# Patient Record
Sex: FEMALE | Race: WHITE | NOT HISPANIC OR LATINO | Employment: FULL TIME | URBAN - NONMETROPOLITAN AREA
[De-identification: names, ages, dates, MRNs, and addresses within clinical notes are randomized per-mention and may not be internally consistent; named-entity substitution may affect disease eponyms.]

---

## 2019-03-07 LAB
EXTERNAL HIV CONFIRMATION: NORMAL
EXTERNAL HIV SCREEN: NORMAL
HCV AB SER-ACNC: NEGATIVE

## 2019-07-25 LAB — HBA1C MFR BLD HPLC: 5.3 %

## 2019-10-13 ENCOUNTER — HOSPITAL ENCOUNTER (EMERGENCY)
Facility: HOSPITAL | Age: 24
Discharge: ANOTHER HOSPITAL | End: 2019-10-13
Attending: EMERGENCY MEDICINE | Admitting: EMERGENCY MEDICINE
Payer: COMMERCIAL

## 2019-10-13 VITALS
RESPIRATION RATE: 16 BRPM | SYSTOLIC BLOOD PRESSURE: 130 MMHG | TEMPERATURE: 98.5 F | OXYGEN SATURATION: 96 % | DIASTOLIC BLOOD PRESSURE: 86 MMHG | WEIGHT: 227.07 LBS | HEART RATE: 82 BPM | HEIGHT: 68 IN | BODY MASS INDEX: 34.41 KG/M2

## 2019-10-13 DIAGNOSIS — Z34.93 THIRD TRIMESTER PREGNANCY: Primary | ICD-10-CM

## 2019-10-13 DIAGNOSIS — O47.9 IRREGULAR UTERINE CONTRACTIONS: ICD-10-CM

## 2019-10-13 LAB
ALBUMIN SERPL BCP-MCNC: 2.6 G/DL (ref 3.5–5)
ALP SERPL-CCNC: 130 U/L (ref 46–116)
ALT SERPL W P-5'-P-CCNC: 23 U/L (ref 12–78)
ANION GAP SERPL CALCULATED.3IONS-SCNC: 9 MMOL/L (ref 4–13)
APTT PPP: 26 SECONDS (ref 23–37)
AST SERPL W P-5'-P-CCNC: 21 U/L (ref 5–45)
BASOPHILS # BLD AUTO: 0.03 THOUSANDS/ΜL (ref 0–0.1)
BASOPHILS NFR BLD AUTO: 0 % (ref 0–1)
BILIRUB SERPL-MCNC: 0.3 MG/DL (ref 0.2–1)
BUN SERPL-MCNC: 7 MG/DL (ref 5–25)
CALCIUM SERPL-MCNC: 9 MG/DL (ref 8.3–10.1)
CHLORIDE SERPL-SCNC: 103 MMOL/L (ref 100–108)
CO2 SERPL-SCNC: 23 MMOL/L (ref 21–32)
CREAT SERPL-MCNC: 0.56 MG/DL (ref 0.6–1.3)
EOSINOPHIL # BLD AUTO: 0.08 THOUSAND/ΜL (ref 0–0.61)
EOSINOPHIL NFR BLD AUTO: 1 % (ref 0–6)
ERYTHROCYTE [DISTWIDTH] IN BLOOD BY AUTOMATED COUNT: 12.9 % (ref 11.6–15.1)
GFR SERPL CREATININE-BSD FRML MDRD: 130 ML/MIN/1.73SQ M
GLUCOSE SERPL-MCNC: 87 MG/DL (ref 65–140)
HCT VFR BLD AUTO: 35.5 % (ref 34.8–46.1)
HGB BLD-MCNC: 11.8 G/DL (ref 11.5–15.4)
IMM GRANULOCYTES # BLD AUTO: 0.05 THOUSAND/UL (ref 0–0.2)
IMM GRANULOCYTES NFR BLD AUTO: 1 % (ref 0–2)
INR PPP: 0.94 (ref 0.84–1.19)
LYMPHOCYTES # BLD AUTO: 1.74 THOUSANDS/ΜL (ref 0.6–4.47)
LYMPHOCYTES NFR BLD AUTO: 16 % (ref 14–44)
MCH RBC QN AUTO: 32 PG (ref 26.8–34.3)
MCHC RBC AUTO-ENTMCNC: 33.2 G/DL (ref 31.4–37.4)
MCV RBC AUTO: 96 FL (ref 82–98)
MONOCYTES # BLD AUTO: 0.7 THOUSAND/ΜL (ref 0.17–1.22)
MONOCYTES NFR BLD AUTO: 6 % (ref 4–12)
NEUTROPHILS # BLD AUTO: 8.5 THOUSANDS/ΜL (ref 1.85–7.62)
NEUTS SEG NFR BLD AUTO: 76 % (ref 43–75)
NRBC BLD AUTO-RTO: 0 /100 WBCS
PLATELET # BLD AUTO: 233 THOUSANDS/UL (ref 149–390)
PMV BLD AUTO: 10.4 FL (ref 8.9–12.7)
POTASSIUM SERPL-SCNC: 4 MMOL/L (ref 3.5–5.3)
PROT SERPL-MCNC: 7 G/DL (ref 6.4–8.2)
PROTHROMBIN TIME: 12.6 SECONDS (ref 11.6–14.5)
RBC # BLD AUTO: 3.69 MILLION/UL (ref 3.81–5.12)
SODIUM SERPL-SCNC: 135 MMOL/L (ref 136–145)
WBC # BLD AUTO: 11.1 THOUSAND/UL (ref 4.31–10.16)

## 2019-10-13 PROCEDURE — 99285 EMERGENCY DEPT VISIT HI MDM: CPT | Performed by: EMERGENCY MEDICINE

## 2019-10-13 PROCEDURE — 99285 EMERGENCY DEPT VISIT HI MDM: CPT

## 2019-10-13 PROCEDURE — 85610 PROTHROMBIN TIME: CPT | Performed by: EMERGENCY MEDICINE

## 2019-10-13 PROCEDURE — 85730 THROMBOPLASTIN TIME PARTIAL: CPT | Performed by: EMERGENCY MEDICINE

## 2019-10-13 PROCEDURE — 85025 COMPLETE CBC W/AUTO DIFF WBC: CPT | Performed by: EMERGENCY MEDICINE

## 2019-10-13 PROCEDURE — 36415 COLL VENOUS BLD VENIPUNCTURE: CPT | Performed by: EMERGENCY MEDICINE

## 2019-10-13 PROCEDURE — 80053 COMPREHEN METABOLIC PANEL: CPT | Performed by: EMERGENCY MEDICINE

## 2019-10-13 RX ORDER — FERROUS SULFATE 325(65) MG
325 TABLET ORAL
COMMUNITY
End: 2020-11-18 | Stop reason: ALTCHOICE

## 2019-10-13 NOTE — ED NOTES
Pt stated that she had one contractions around 17:30 PM     Glenny Molina, IFEANYI  10/13/19 3877 Melanie Cotton RN  10/13/19 2246

## 2019-10-13 NOTE — ED PROVIDER NOTES
History  Chief Complaint   Patient presents with    Contractions     patient reports that about 40-45 minutes ago she had a contraction  this is her 3rd pregnancy  patient is 39 weeks gestration today     50-year-old  at 39 weeks due 10/20/19 followed by Dr Perston Olivia at [facility] presents with cramping and 1 contraction approximately 45 minutes prior to arrival she has had some leakage of fluid but no bleeding no gush of fluid no further contractions she has been slightly nauseated today no history of any fevers  Baby has been very active  She has had no prenatal problems except for gestational diabetes but last blood sugars were in the mid 150s  Prior to Admission Medications   Prescriptions Last Dose Informant Patient Reported? Taking? Prenatal MV-Min-Fe Fum-FA-DHA (PRENATAL 1 PO) 10/13/2019 at Unknown time  Yes Yes   Sig: Take by mouth   ferrous sulfate 325 (65 Fe) mg tablet   Yes Yes   Sig: Take 325 mg by mouth daily with breakfast      Facility-Administered Medications: None       Past Medical History:   Diagnosis Date    Gestational diabetes        History reviewed  No pertinent surgical history  History reviewed  No pertinent family history  I have reviewed and agree with the history as documented  Social History     Tobacco Use    Smoking status: Former Smoker    Smokeless tobacco: Never Used   Substance Use Topics    Alcohol use: No    Drug use: No        Review of Systems   Constitutional: Negative for activity change, appetite change, chills and fever  HENT: Negative for congestion, ear pain, sinus pressure, sinus pain and voice change  Eyes: Negative for visual disturbance  Respiratory: Negative for cough and shortness of breath  Cardiovascular: Positive for leg swelling  Gastrointestinal: Positive for abdominal pain and nausea  Genitourinary: Positive for vaginal discharge (slight increased in clear fluid)   Negative for difficulty urinating, dysuria and vaginal bleeding  Neurological: Negative for light-headedness and headaches  Psychiatric/Behavioral: Negative for confusion  All other systems reviewed and are negative  Physical Exam  Physical Exam   Constitutional: She is oriented to person, place, and time  She appears well-developed  No distress  HENT:   Head: Normocephalic and atraumatic  Right Ear: External ear normal    Left Ear: External ear normal    Nose: Nose normal    Mouth/Throat: Oropharynx is clear and moist  No oropharyngeal exudate  Eyes: Pupils are equal, round, and reactive to light  Conjunctivae are normal  Right eye exhibits no discharge  Left eye exhibits no discharge  Neck: Normal range of motion  Neck supple  Cardiovascular: Normal rate, regular rhythm, normal heart sounds and intact distal pulses  Pulmonary/Chest: Effort normal and breath sounds normal    Abdominal: Soft  Bowel sounds are normal    Gravid NT   Genitourinary:   Genitourinary Comments: Cervical check: Sherry washington  Sterile gloves with sterile surgilube - infant scalp not palpable cervix 2-3 dilated   Musculoskeletal: She exhibits edema (trace)  Lymphadenopathy:     She has no cervical adenopathy  Neurological: She is alert and oriented to person, place, and time  No cranial nerve deficit or sensory deficit  She exhibits normal muscle tone  Coordination normal    Skin: Skin is warm and dry  Capillary refill takes less than 2 seconds  No rash noted  She is not diaphoretic  Psychiatric: She has a normal mood and affect  Vitals reviewed        Vital Signs  ED Triage Vitals   Temperature Pulse Respirations Blood Pressure SpO2   10/13/19 1744 10/13/19 1744 10/13/19 1744 10/13/19 1815 10/13/19 1744   98 5 °F (36 9 °C) 70 18 130/86 96 %      Temp Source Heart Rate Source Patient Position - Orthostatic VS BP Location FiO2 (%)   10/13/19 1744 10/13/19 1744 10/13/19 1744 10/13/19 1744 --   Temporal Monitor Lying Left arm       Pain Score 10/13/19 1744       No Pain           Vitals:    10/13/19 1744 10/13/19 1815   BP:  130/86   Pulse: 70 82   Patient Position - Orthostatic VS: Lying Sitting         Visual Acuity      ED Medications  Medications - No data to display    Diagnostic Studies  Results Reviewed     Procedure Component Value Units Date/Time    Comprehensive metabolic panel [[id number]]  (Abnormal) Collected:  10/13/19 1801    Lab Status:  Final result Specimen:  Blood from Arm, Right Updated:  10/13/19 1829     Sodium 135 mmol/L      Potassium 4 0 mmol/L      Chloride 103 mmol/L      CO2 23 mmol/L      ANION GAP 9 mmol/L      BUN 7 mg/dL      Creatinine 0 56 mg/dL      Glucose 87 mg/dL      Calcium 9 0 mg/dL      AST 21 U/L      ALT 23 U/L      Alkaline Phosphatase 130 U/L      Total Protein 7 0 g/dL      Albumin 2 6 g/dL      Total Bilirubin 0 30 mg/dL      eGFR 130 ml/min/1 73sq m     Narrative:       [place] guidelines for Chronic Kidney Disease (CKD):     Stage 1 with normal or high GFR (GFR > 90 mL/min/1 73 square meters)    Stage 2 Mild CKD (GFR = 60-89 mL/min/1 73 square meters)    Stage 3A Moderate CKD (GFR = 45-59 mL/min/1 73 square meters)    Stage 3B Moderate CKD (GFR = 30-44 mL/min/1 73 square meters)    Stage 4 Severe CKD (GFR = 15-29 mL/min/1 73 square meters)    Stage 5 End Stage CKD (GFR <15 mL/min/1 73 square meters)  Note: GFR calculation is accurate only with a steady state creatinine    Protime-INR [[id number]]  (Normal) Collected:  10/13/19 1801    Lab Status:  Final result Specimen:  Blood from Arm, Right Updated:  10/13/19 1821     Protime 12 6 seconds      INR 0 94    APTT [[id number]]  (Normal) Collected:  10/13/19 1801    Lab Status:  Final result Specimen:  Blood from Arm, Right Updated:  10/13/19 1821     PTT 26 seconds     CBC and differential [[id number]]  (Abnormal) Collected:  10/13/19 1801    Lab Status:  Final result Specimen:  Blood from Arm, Right Updated:  10/13/19 1811 WBC 11 10 Thousand/uL      RBC 3 69 Million/uL      Hemoglobin 11 8 g/dL      Hematocrit 35 5 %      MCV 96 fL      MCH 32 0 pg      MCHC 33 2 g/dL      RDW 12 9 %      MPV 10 4 fL      Platelets 545 Thousands/uL      nRBC 0 /100 WBCs      Neutrophils Relative 76 %      Immat GRANS % 1 %      Lymphocytes Relative 16 %      Monocytes Relative 6 %      Eosinophils Relative 1 %      Basophils Relative 0 %      Neutrophils Absolute 8 50 Thousands/µL      Immature Grans Absolute 0 05 Thousand/uL      Lymphocytes Absolute 1 74 Thousands/µL      Monocytes Absolute 0 70 Thousand/µL      Eosinophils Absolute 0 08 Thousand/µL      Basophils Absolute 0 03 Thousands/µL                  No orders to display              Procedures  Procedures       ED Course  ED Course as of Oct 13 2043   Reg [facility] Oct 13, 2019   1801 Family is declining ambuulance transfer to L&D at [facility] prefers to go by private vehicle      3352 Case reviewed with Dr Jaylene Hunter [facility] will be directed to L&D      1822 Based on our evaluation Dr David Simons echo [facility] Gyne felt patient was in very early labor he asked me to inform patients that they may be sent home I did let them know they will proceed to L and D for further evaluation  MDM  Number of Diagnoses or Management Options  Irregular uterine contractions: Third trimester pregnancy:   Diagnosis management comments: Mdm: full term gravid female needs to be ruled out for labor will facilitate transfer to L&D     Bedside POCT  u/s ; head down       Disposition  Final diagnoses:    Third trimester pregnancy - 39 weeks   Irregular uterine contractions     Time reflects when diagnosis was documented in both MDM as applicable and the Disposition within this note     Time User Action Codes Description Comment    10/13/2019  6:04 PM Blenda Mtz Add [Z34 93] Third trimester pregnancy     10/13/2019  6:04 PM Blenda Mtz Add [O62 2] Irregular uterine contractions     10/13/2019  6:04 PM Robel Robertson [O30 98] Third trimester pregnancy 39 weeks      ED Disposition     ED Disposition Condition Date/Time Comment    Transfer to Another [facility] Oct 13, 2019  6:14 PM Ottoniel Martinez should be transferred out to [address] [place] [address] L&D Dr Germain Luna MD Documentation      Most Recent Value   Patient Condition  The patient has been stabilized such that within reasonable medical probability, no material deterioration of the patient condition or the condition of the unborn child(loulou) is likely to result from the transfer   Reason for Transfer  Level of Care needed not available at this facility   Benefits of Transfer  Specialized equipment and/or services available at the receiving facility (Include comment)________________________   Risks of Transfer  Potential for delay in receiving treatment   Accepting Physician  DR Mclean 27 Name, [facility] L&D [place], [place]     (Name & Tel number)  PACs   Sending MD DR Haider   Provider Certification  General risk, such as traffic hazards, adverse weather conditions, rough terrain or turbulence, possible failure of equipment (including vehicle or aircraft), or consequences of actions of persons outside the control of the transport personnel      RN Documentation      Most [facility] Name, [facility] L&D [place], [place]    Bed Assignment  ED     (Name & Tel number)  PACs   Report Given to  IFEANYI Wood       Follow-up Information    None         Discharge Medication List as of 10/13/2019  6:40 PM      CONTINUE these medications which have NOT CHANGED    Details   ferrous sulfate 325 (65 Fe) mg tablet Take 325 mg by mouth daily with breakfast, Historical Med      Prenatal MV-Min-Fe Fum-FA-DHA (PRENATAL 1 PO) Take by mouth, Historical Med           No discharge procedures on file     ED Provider  Electronically Signed by           Jeny Pereira MD  10/13/19 1129       Jeny Pereira MD  10/13/19 9232

## 2019-10-13 NOTE — EMTALA/ACUTE CARE TRANSFER
[facility] EMERGENCY DEPARTMENT  [address] [place] [id number]  Dept: [phone]      EMTALA TRANSFER CONSENT    NAME Josette [facility]                                         1995                              MRN [id number]    I have been informed of my rights regarding examination, treatment, and transfer   by Dr Natanael Gomez MD    Benefits: Specialized equipment and/or services available at the receiving facility (Include comment)________________________    Risks: Potential for delay in receiving treatment      Transfer Request   I acknowledge that my medical condition has been evaluated and explained to me by the emergency department physician or other qualified medical person and/or my attending physician who has recommended and offered to me further medical examination and treatment  I understand the Hospital's obligation with respect to the treatment and stabilization of my emergency medical condition  I nevertheless request to be transferred  I release the Hospital, the doctor, and any other persons caring for me from all responsibility or liability for any injury or ill effects that may result from my transfer and agree to accept all responsibility for the consequences of my choice to transfer, rather than receive stabilizing treatment at the Hospital  I understand that because the transfer is my request, my insurance may not provide reimbursement for the services  The Hospital will assist and direct me and my family in how to make arrangements for transfer, but the hospital is not liable for any fees charged by the transport service  In spite of this understanding, I refuse to consent to further medical examination and treatment which has been offered to me, and request transfer to  [facility] Name, [address] : [facility] [place], [place]   I authorize the performance of emergency medical procedures and treatments upon me in both transit and upon arrival at the receiving facility  Additionally, I authorize the release of any and all medical records to the receiving facility and request they be transported with me, if possible  I authorize the performance of emergency medical procedures and treatments upon me in both transit and upon arrival at the receiving facility  Additionally, I authorize the release of any and all medical records to the receiving facility and request they be transported with me, if possible  I understand that the safest mode of transportation during a medical emergency is an ambulance and that the Hospital advocates the use of this mode of transport  Risks of traveling to the receiving facility by car, including absence of medical control, life sustaining equipment, such as oxygen, and medical personnel has been explained to me and I fully understand them  (ANGELO CORRECT BOX BELOW)  [  ]  I consent to the stated transfer and to be transported by ambulance/helicopter  [ x ]  I consent to the stated transfer, but refuse transportation by ambulance and accept full responsibility for my transportation by car  I understand the risks of non-ambulance transfers and I exonerate the Hospital and its staff from any deterioration in my condition that results from this refusal     X___________________________________________    DATE  10/13/19  TIME________  Signature of patient or legally responsible individual signing on patient behalf           RELATIONSHIP TO PATIENT_________________________          Provider Certification    NAME Nicko Sarabia                                        [facility] 1995                              MRN [id number]    A medical screening exam was performed on the above named patient  Based on the examination:    Condition Necessitating Transfer The primary encounter diagnosis was Third trimester pregnancy  A diagnosis of Irregular uterine contractions was also pertinent to this visit      Patient Condition: The patient has been stabilized such that within reasonable medical probability, no material deterioration of the patient condition or the condition of the unborn child(loulou) is likely to result from the transfer    Reason for Transfer: Level of Care needed not available at this facility    Transfer Requirements: Facility [facility] [place]   · Space available and qualified personnel available for treatment as acknowledged by PACs  · Agreed to accept transfer and to provide appropriate medical treatment as acknowledged by       Dr Katiana Molina  · Appropriate medical records of the examination and treatment of the patient are provided at the time of transfer   [facility],[address] _______  · Transfer will be performed by qualified personnel from    and appropriate transfer equipment as required, including the use of necessary and appropriate life support measures  Provider Certification: I have examined the patient and explained the following risks and benefits of being transferred/refusing transfer to the patient/family:  General risk, such as traffic hazards, adverse weather conditions, rough terrain or turbulence, possible failure of equipment (including vehicle or aircraft), or consequences of actions of persons outside the control of the transport personnel      Based on these reasonable risks and benefits to the patient and/or the unborn child(loulou), and based upon the information available at the time of the patients examination, I certify that the medical benefits reasonably to be expected from the provision of appropriate medical treatments at another medical facility outweigh the increasing risks, if any, to the individuals medical condition, and in the case of labor to the unborn child, from effecting the transfer      X____________________________________________ DATE 10/13/19        TIME_______      ORIGINAL - SEND TO MEDICAL RECORDS   COPY - SEND WITH PATIENT DURING TRANSFER

## 2020-02-05 ENCOUNTER — TELEPHONE (OUTPATIENT)
Dept: FAMILY MEDICINE CLINIC | Facility: HOME HEALTHCARE | Age: 25
End: 2020-02-05

## 2020-02-24 ENCOUNTER — TELEPHONE (OUTPATIENT)
Dept: ADMINISTRATIVE | Facility: OTHER | Age: 25
End: 2020-02-24

## 2020-02-24 NOTE — TELEPHONE ENCOUNTER
----- Message from Kervin Rene, Martha Denney sent at 2/24/2020  8:50 AM EST -----  02/24/20 8:51 AM    Hello, our patient Armida Longest has had HIV completed/performed  Please assist in updating the patient chart by pulling the document from the Media Tab  The date of service is 9/29/2018       Thank you,  Kervin Rene MA  [place] [address]

## 2020-02-24 NOTE — TELEPHONE ENCOUNTER
Upon review of the In Basket request we were able to locate, review, and update the patient chart as requested for HIV  Any additional questions or concerns should be emailed to the Practice Liaisons via [email or web address] email, please do not reply via In Basket      Thank you  Summer Sharma MA

## 2020-03-10 ENCOUNTER — TELEPHONE (OUTPATIENT)
Dept: FAMILY MEDICINE CLINIC | Facility: CLINIC | Age: 25
End: 2020-03-10

## 2020-03-10 NOTE — TELEPHONE ENCOUNTER
Left message for patient to call office back to schedule an appointment  We have not seen patient here

## 2020-11-18 ENCOUNTER — OFFICE VISIT (OUTPATIENT)
Dept: FAMILY MEDICINE CLINIC | Facility: CLINIC | Age: 25
End: 2020-11-18
Payer: COMMERCIAL

## 2020-11-18 VITALS
RESPIRATION RATE: 16 BRPM | WEIGHT: 188.8 LBS | SYSTOLIC BLOOD PRESSURE: 110 MMHG | DIASTOLIC BLOOD PRESSURE: 70 MMHG | BODY MASS INDEX: 28.71 KG/M2 | TEMPERATURE: 97.3 F | OXYGEN SATURATION: 97 % | HEART RATE: 62 BPM

## 2020-11-18 DIAGNOSIS — Z00.00 ANNUAL PHYSICAL EXAM: Primary | ICD-10-CM

## 2020-11-18 DIAGNOSIS — Z11.1 SCREENING FOR TUBERCULOSIS: ICD-10-CM

## 2020-11-18 PROCEDURE — 99395 PREV VISIT EST AGE 18-39: CPT | Performed by: FAMILY MEDICINE

## 2020-11-18 PROCEDURE — T1015 CLINIC SERVICE: HCPCS | Performed by: FAMILY MEDICINE

## 2020-11-20 ENCOUNTER — CLINICAL SUPPORT (OUTPATIENT)
Dept: FAMILY MEDICINE CLINIC | Facility: CLINIC | Age: 25
End: 2020-11-20

## 2020-11-20 DIAGNOSIS — Z11.1 SCREENING FOR TUBERCULOSIS: Primary | ICD-10-CM

## 2020-11-20 LAB
INDURATION: 0 MM
TB SKIN TEST: NEGATIVE

## 2020-12-02 ENCOUNTER — CLINICAL SUPPORT (OUTPATIENT)
Dept: FAMILY MEDICINE CLINIC | Facility: CLINIC | Age: 25
End: 2020-12-02

## 2020-12-02 DIAGNOSIS — Z11.1 SCREENING FOR TUBERCULOSIS: Primary | ICD-10-CM

## 2020-12-14 ENCOUNTER — CLINICAL SUPPORT (OUTPATIENT)
Dept: FAMILY MEDICINE CLINIC | Facility: CLINIC | Age: 25
End: 2020-12-14

## 2020-12-14 DIAGNOSIS — Z11.1 SCREENING FOR TUBERCULOSIS: ICD-10-CM

## 2020-12-16 ENCOUNTER — CLINICAL SUPPORT (OUTPATIENT)
Dept: FAMILY MEDICINE CLINIC | Facility: CLINIC | Age: 25
End: 2020-12-16

## 2020-12-16 DIAGNOSIS — Z11.1 SCREENING FOR TUBERCULOSIS: Primary | ICD-10-CM

## 2020-12-16 LAB
INDURATION: 0 MM
INDURATION: 0 MM
TB SKIN TEST: NEGATIVE
TB SKIN TEST: NEGATIVE

## 2021-07-28 ENCOUNTER — OFFICE VISIT (OUTPATIENT)
Dept: FAMILY MEDICINE CLINIC | Facility: CLINIC | Age: 26
End: 2021-07-28
Payer: COMMERCIAL

## 2021-07-28 VITALS
SYSTOLIC BLOOD PRESSURE: 100 MMHG | HEART RATE: 62 BPM | WEIGHT: 173 LBS | DIASTOLIC BLOOD PRESSURE: 64 MMHG | TEMPERATURE: 97.8 F | OXYGEN SATURATION: 98 % | BODY MASS INDEX: 26.22 KG/M2 | HEIGHT: 68 IN | RESPIRATION RATE: 18 BRPM

## 2021-07-28 DIAGNOSIS — Z11.59 ENCOUNTER FOR HEPATITIS C SCREENING TEST FOR LOW RISK PATIENT: ICD-10-CM

## 2021-07-28 DIAGNOSIS — Z02.89 EXAMINATION, PHYSICAL, EMPLOYEE: Primary | ICD-10-CM

## 2021-07-28 PROCEDURE — T1015 CLINIC SERVICE: HCPCS | Performed by: FAMILY MEDICINE

## 2021-07-28 PROCEDURE — 99213 OFFICE O/P EST LOW 20 MIN: CPT | Performed by: FAMILY MEDICINE

## 2021-07-28 NOTE — PROGRESS NOTES
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  On examination patient had an ecchymosis on right arm, measuring about 10cm * 6cm which was happened to be due to a punching game with her boy friend  Patient feels safe at home, lives with her kids, boy friend will visit occasionally  Rest of the physical examination is normal    Handed over the signed physical form  Chief Complaint     Chief Complaint   Patient presents with    Employment Physical     no complaints today from patient       History of Present Illness     Arturo Caro 30 yo female with PMHx of gestational diabetes came to the office for Employment physical  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  Patient does not have active complaints  The following portions of the patient's history were reviewed and updated as appropriate: allergies, current medications, past family history, past medical history, past social history, past surgical history and problem list     Review of Systems     Review of Systems   Constitutional: Negative  HENT: Negative  Eyes: Negative  Respiratory: Negative  Cardiovascular: Negative  Gastrointestinal: Negative  Endocrine: Negative  Genitourinary: Negative  Musculoskeletal: Negative  Skin: Positive for color change and wound  Allergic/Immunologic: Negative  Neurological: Negative  Hematological: Negative  Psychiatric/Behavioral: Negative          Active Problem List   There is no problem list on file for this patient  Objective     /64   Pulse 62   Temp 97 8 °F (36 6 °C)   Resp 18   Ht 5' 8" (1 727 m)   Wt 78 5 kg (173 lb)   SpO2 98%   BMI 26 30 kg/m²     Physical Exam  Vitals and nursing note reviewed  Constitutional:       Appearance: Normal appearance  She is normal weight  HENT:      Head: Normocephalic and atraumatic  Right Ear: Tympanic membrane, ear canal and external ear normal       Left Ear: Tympanic membrane, ear canal and external ear normal       Nose: Nose normal       Mouth/Throat:      Mouth: Mucous membranes are moist       Pharynx: Oropharynx is clear  Eyes:      Extraocular Movements: Extraocular movements intact  Conjunctiva/sclera: Conjunctivae normal       Pupils: Pupils are equal, round, and reactive to light  Cardiovascular:      Rate and Rhythm: Normal rate and regular rhythm  Pulses: Normal pulses  Heart sounds: Normal heart sounds  Pulmonary:      Effort: Pulmonary effort is normal  No respiratory distress  Breath sounds: Normal breath sounds  No wheezing  Abdominal:      General: Abdomen is flat  Bowel sounds are normal  There is no distension  Palpations: Abdomen is soft  Tenderness: There is no abdominal tenderness  Musculoskeletal:         General: Normal range of motion  Cervical back: Normal range of motion and neck supple  Skin:     General: Skin is warm and dry  Capillary Refill: Capillary refill takes less than 2 seconds  Findings: Bruising ( bruise on right arm, measuring 10cm * 5cm  ) present  Neurological:      General: No focal deficit present  Mental Status: She is alert and oriented to person, place, and time  Cranial Nerves: No cranial nerve deficit  Sensory: No sensory deficit  Motor: No weakness  Gait: Gait normal    Psychiatric:         Mood and Affect: Mood normal          Current Medications   No current outpatient medications on file      Health Maintenance Health Maintenance   Topic Date Due    Hepatitis C Screening  Never done    Pneumococcal Vaccine: Pediatrics (0 to 5 Years) and At-Risk Patients (6 to 59 Years) (1 of 2 - PPSV23) Never done    COVID-19 Vaccine (1) Never done    Cervical Cancer Screening  Never done    Influenza Vaccine (1) 09/01/2021    Depression Screening PHQ  11/18/2021    Annual Physical  11/18/2021    BMI: Adult  07/28/2022    DTaP,Tdap,and Td Vaccines (2 - Td or Tdap) 07/22/2029    HIV Screening  Completed    HIB Vaccine  Aged Out    Hepatitis B Vaccine  Aged Out    IPV Vaccine  Aged Out    Hepatitis A Vaccine  Aged Out    Meningococcal ACWY Vaccine  Aged Out    HPV Vaccine  Aged Out     Immunization History   Administered Date(s) Administered    INFLUENZA 10/21/2019    TD (adult) Preservative Free 02/28/2016    Tdap 07/22/2019    Tuberculin Skin Test-PPD Intradermal 11/18/2020, 12/02/2020, 12/14/2020           Samantha Villagomez MD   Internal Medicine  PGY-1  7/28/2021 4:57 PM

## 2021-08-25 ENCOUNTER — OFFICE VISIT (OUTPATIENT)
Dept: FAMILY MEDICINE CLINIC | Facility: CLINIC | Age: 26
End: 2021-08-25
Payer: COMMERCIAL

## 2021-08-25 VITALS
TEMPERATURE: 97 F | WEIGHT: 176 LBS | SYSTOLIC BLOOD PRESSURE: 112 MMHG | BODY MASS INDEX: 26.67 KG/M2 | HEART RATE: 54 BPM | HEIGHT: 68 IN | DIASTOLIC BLOOD PRESSURE: 72 MMHG | OXYGEN SATURATION: 99 %

## 2021-08-25 DIAGNOSIS — Z02.0 SCHOOL PHYSICAL EXAM: ICD-10-CM

## 2021-08-25 DIAGNOSIS — L70.0 ACNE VULGARIS: ICD-10-CM

## 2021-08-25 DIAGNOSIS — F41.9 ANXIETY: Primary | ICD-10-CM

## 2021-08-25 PROCEDURE — T1015 CLINIC SERVICE: HCPCS | Performed by: FAMILY MEDICINE

## 2021-08-25 PROCEDURE — 99214 OFFICE O/P EST MOD 30 MIN: CPT | Performed by: FAMILY MEDICINE

## 2021-08-25 RX ORDER — CLINDAMYCIN PHOSPHATE 11.9 MG/ML
1 SOLUTION TOPICAL DAILY
COMMUNITY
Start: 2021-08-11 | End: 2021-08-25 | Stop reason: SDUPTHER

## 2021-08-25 RX ORDER — FLUOXETINE HYDROCHLORIDE 20 MG/1
20 CAPSULE ORAL DAILY
Qty: 30 CAPSULE | Refills: 1 | Status: SHIPPED | OUTPATIENT
Start: 2021-08-25 | End: 2022-01-26

## 2021-08-25 RX ORDER — CLINDAMYCIN PHOSPHATE 11.9 MG/ML
1 SOLUTION TOPICAL DAILY
Qty: 30 ML | Refills: 1 | Status: SHIPPED | OUTPATIENT
Start: 2021-08-25 | End: 2022-01-26

## 2021-08-25 NOTE — PROGRESS NOTES
Assessment/Plan:     Diagnoses and all orders for this visit:    Anxiety  -     FLUoxetine (PROzac) 20 mg capsule; Take 1 capsule (20 mg total) by mouth daily  -     CBC and differential; Future  -     Comprehensive metabolic panel; Future  -     Hemoglobin A1C; Future  -     Lipid panel; Future  -     TSH, 3rd generation with Free T4 reflex; Future    Acne vulgaris  -     clindamycin (CLEOCIN T) 1 % external solution; Apply 1 application topically daily    School physical exam    Other orders  -     Discontinue: clindamycin (CLEOCIN T) 1 % external solution; Apply 1 application topically daily            Return in about 6 weeks (around 10/6/2021) for Recheck anxiety  Subjective:        Patient ID: Irma Ragland is a 31 y o  female  Chief Complaint   Patient presents with    Physical Exam       PMH gestational diabetes presents for school physical and anxiety  Is not currently pregnant and is using contraception as she is enrolled in CNA and phlebotomy programs  Reports greater than 5 years of symptoms of anxiety and agoraphobia  Denies family history of same as well as any history of abuse or trauma  Labs ordered for completeness and SSRI started today  Physical forms completed with accompanying immunization records    Anxiety  Presents for initial visit  Onset was more than 5 years ago  The problem has been waxing and waning  Symptoms include decreased concentration, irritability, nervous/anxious behavior and panic  Patient reports no chest pain, compulsions, confusion, depressed mood, dizziness, dry mouth, excessive worry, feeling of choking, hyperventilation, impotence, insomnia, malaise, muscle tension, nausea, obsessions, palpitations, restlessness, shortness of breath or suicidal ideas  Symptoms occur most days  The most recent episode lasted 1 hour  The severity of symptoms is moderate and interfering with daily activities  Nothing aggravates the symptoms  The patient sleeps 10 hours per night   The quality of sleep is good  Nighttime awakenings: occasional      Risk factors include alcohol intake  The following portions of the patient's history were reviewed and updated as appropriate: allergies, current medications, past family history, past medical history, past social history, past surgical history and problem list     Patient Active Problem List   Diagnosis    Anxiety    Acne vulgaris       Current Outpatient Medications   Medication Sig Dispense Refill    clindamycin (CLEOCIN T) 1 % external solution Apply 1 application topically daily 30 mL 1    FLUoxetine (PROzac) 20 mg capsule Take 1 capsule (20 mg total) by mouth daily 30 capsule 1     No current facility-administered medications for this visit  Past Medical History:   Diagnosis Date    Gestational diabetes         History reviewed  No pertinent surgical history  Social History     Socioeconomic History    Marital status: Single     Spouse name: Not on file    Number of children: Not on file    Years of education: Not on file    Highest education level: Not on file   Occupational History    Not on file   Tobacco Use    Smoking status: Current Some Day Smoker    Smokeless tobacco: Never Used   Vaping Use    Vaping Use: Never used   Substance and Sexual Activity    Alcohol use: Yes    Drug use: No    Sexual activity: Yes     Partners: Male   Other Topics Concern    Not on file   Social History Narrative    Not on file     Social Determinants of Health     Financial Resource Strain:     Difficulty of Paying Living Expenses:    Food Insecurity:     Worried About Running Out of Food in the Last Year:     [address] in the Last Year:    Transportation Needs:     Lack of Transportation (Medical):      Lack of Transportation (Non-Medical):    Physical Activity:     Days of Exercise per Week:     Minutes of Exercise per Session:    Stress:     Feeling of Stress :    Social Connections:     Frequency of Communication with Friends and Family:     Frequency of Social Gatherings with Friends and Family:     Attends Gnosticist Services:     Active Member of Clubs or Organizations:     Attends Club or Organization Meetings:     Marital Status:    Intimate Partner Violence:     Fear of Current or Ex-Partner:     Emotionally Abused:     Physically Abused:     Sexually Abused:    Review of Systems   Constitutional: Positive for irritability  Negative for activity change, appetite change, fatigue and unexpected weight change  HENT: Negative for congestion, ear pain, hearing loss, nosebleeds, rhinorrhea, sinus pain and trouble swallowing  Eyes: Negative for photophobia  Respiratory: Negative for choking, shortness of breath and wheezing  Cardiovascular: Negative for chest pain, palpitations and leg swelling  Gastrointestinal: Negative for abdominal pain, blood in stool, constipation, diarrhea and nausea  Endocrine: Negative for polydipsia, polyphagia and polyuria  Genitourinary: Negative for difficulty urinating, dysuria, frequency, hematuria, impotence and urgency  Musculoskeletal: Negative for arthralgias, back pain and myalgias  Skin: Negative for color change, rash and wound  Facial acne   Neurological: Negative for dizziness, tremors, syncope, weakness and headaches  Psychiatric/Behavioral: Positive for decreased concentration  Negative for agitation, confusion, self-injury and suicidal ideas  The patient is nervous/anxious  The patient does not have insomnia  Objective:      /72   Pulse (!) 54   Temp (!) 97 °F (36 1 °C)   Ht 5' 8" (1 727 m)   Wt 79 8 kg (176 lb)   SpO2 99%   BMI 26 76 kg/m²          Physical Exam  Vitals and nursing note reviewed  Constitutional:       Appearance: Normal appearance  HENT:      Head: Normocephalic and atraumatic        Nose: Nose normal       Mouth/Throat:      Mouth: Mucous membranes are moist    Eyes:      Extraocular Movements: Extraocular movements intact  Conjunctiva/sclera: Conjunctivae normal       Pupils: Pupils are equal, round, and reactive to light  Cardiovascular:      Rate and Rhythm: Normal rate and regular rhythm  Pulmonary:      Effort: Pulmonary effort is normal       Breath sounds: Normal breath sounds  Abdominal:      General: Abdomen is flat  Bowel sounds are normal       Palpations: Abdomen is soft  Genitourinary:     Comments: Exam deferred  Musculoskeletal:         General: Normal range of motion  Cervical back: Normal range of motion and neck supple  Skin:     General: Skin is warm and dry  Capillary Refill: Capillary refill takes less than 2 seconds  Findings: Acne (facial) present  Neurological:      General: No focal deficit present  Mental Status: She is alert and oriented to person, place, and time     Psychiatric:         Mood and Affect: Mood normal          Behavior: Behavior normal

## 2021-08-25 NOTE — PATIENT INSTRUCTIONS
Anxiety   AMBULATORY CARE:   Anxiety  is a condition that causes you to feel extremely worried or nervous  The feelings are so strong that they can cause problems with your daily activities or sleep  Anxiety may be triggered by something you fear, or it may happen without a cause  Family or work stress, smoking, caffeine, and alcohol can increase your risk for anxiety  Certain medicines or health conditions can also increase your risk  Anxiety can become a long-term condition if it is not managed or treated  Common signs and symptoms that may occur with anxiety:   · Fatigue or muscle tightness    · Shaking, restlessness, or irritability    · Problems focusing    · Trouble sleeping    · Feeling jumpy, easily startled, or dizzy    · Rapid heartbeat or shortness of breath    Call your local emergency number (911 in the [address]) if:   · You have chest pain, tightness, or heaviness that may spread to your shoulders, arms, jaw, neck, or back  · You feel like hurting yourself or someone else  Call your doctor if:   · Your symptoms get worse or do not get better with treatment  · Your anxiety keeps you from doing your regular daily activities  · You have new symptoms since your last visit  · You have questions or concerns about your condition or care  Treatment for anxiety  may include medicines to help you feel calm and relaxed, and decrease your symptoms  Medicines are usually given together with therapy or other treatments  Manage anxiety:   · Talk to someone about your anxiety  Your healthcare provider may suggest counseling  Cognitive behavioral therapy can help you understand and change how you react to events that trigger your symptoms  You might feel more comfortable talking with a friend or family member about your anxiety  Choose someone you know will be supportive and encouraging  · Find ways to relax  Activities such as exercise, meditation, or listening to music can help you relax   Spend time with friends, or do things you enjoy  · Practice deep breathing  Deep breathing can help you relax when you feel anxious  Focus on taking slow, deep breaths several times a day, or during an anxiety attack  Breathe in through your nose and out through your mouth  · Create a regular sleep routine  Regular sleep can help you feel calmer during the day  Go to sleep and wake up at the same times every day  Do not watch television or use the computer right before bed  Your room should be comfortable, dark, and quiet  · Eat a variety of healthy foods  Healthy foods include fruits, vegetables, low-fat dairy products, lean meats, fish, whole-grain breads, and cooked beans  Healthy foods can help you feel less anxious and have more energy  · Exercise regularly  Exercise can increase your energy level  Exercise may also lift your mood and help you sleep better  Your healthcare provider can help you create an exercise plan  · Do not smoke  Nicotine and other chemicals in cigarettes and cigars can increase anxiety  Ask your healthcare provider for information if you currently smoke and need help to quit  E-cigarettes or smokeless tobacco still contain nicotine  Talk to your healthcare provider before you use these products  · Do not have caffeine  Caffeine can make your symptoms worse  Do not have foods or drinks that are meant to increase your energy level  · Limit or do not drink alcohol  Ask your healthcare provider if alcohol is safe for you  You may not be able to drink alcohol if you take certain anxiety or depression medicines  Limit alcohol to 1 drink per day if you are a woman  Limit alcohol to 2 drinks per day if you are a man  A drink of alcohol is 12 ounces of beer, 5 ounces of wine, or 1½ ounces of liquor  · Do not use drugs  Drugs can make your anxiety worse  It can also make anxiety hard to manage   Talk to your healthcare provider if you use drugs and want help to quit     Follow up with your doctor within 2 weeks or as directed:  Write down your questions so you remember to ask them during your visits  © Copyright Reissued 2021 Information is for End User's use only and may not be sold, redistributed or otherwise used for commercial purposes  All illustrations and images included in CareNotes® are the copyrighted property of A D A M , Inc  or [facility] Shani Brown   The above information is an  only  It is not intended as medical advice for individual conditions or treatments  Talk to your doctor, nurse or pharmacist before following any medical regimen to see if it is safe and effective for you

## 2021-11-03 ENCOUNTER — APPOINTMENT (OUTPATIENT)
Dept: LAB | Facility: HOSPITAL | Age: 26
End: 2021-11-03
Payer: COMMERCIAL

## 2021-11-03 DIAGNOSIS — Z11.4 SCREENING FOR HIV WITHOUT PRESENCE OF RISK FACTORS: ICD-10-CM

## 2021-11-03 DIAGNOSIS — Z11.4 SCREENING FOR HIV WITHOUT PRESENCE OF RISK FACTORS: Primary | ICD-10-CM

## 2021-11-03 DIAGNOSIS — F41.9 ANXIETY: ICD-10-CM

## 2021-11-03 LAB
ALBUMIN SERPL BCP-MCNC: 3.2 G/DL (ref 3.5–5)
ALP SERPL-CCNC: 52 U/L (ref 46–116)
ALT SERPL W P-5'-P-CCNC: 21 U/L (ref 12–78)
ANION GAP SERPL CALCULATED.3IONS-SCNC: 4 MMOL/L (ref 4–13)
AST SERPL W P-5'-P-CCNC: 14 U/L (ref 5–45)
BASOPHILS # BLD AUTO: 0.05 THOUSANDS/ΜL (ref 0–0.1)
BASOPHILS NFR BLD AUTO: 1 % (ref 0–1)
BILIRUB SERPL-MCNC: 0.24 MG/DL (ref 0.2–1)
BUN SERPL-MCNC: 18 MG/DL (ref 5–25)
CALCIUM ALBUM COR SERPL-MCNC: 9 MG/DL (ref 8.3–10.1)
CALCIUM SERPL-MCNC: 8.4 MG/DL (ref 8.3–10.1)
CHLORIDE SERPL-SCNC: 106 MMOL/L (ref 100–108)
CHOLEST SERPL-MCNC: 135 MG/DL (ref 50–200)
CO2 SERPL-SCNC: 28 MMOL/L (ref 21–32)
CREAT SERPL-MCNC: 0.86 MG/DL (ref 0.6–1.3)
EOSINOPHIL # BLD AUTO: 0.12 THOUSAND/ΜL (ref 0–0.61)
EOSINOPHIL NFR BLD AUTO: 1 % (ref 0–6)
ERYTHROCYTE [DISTWIDTH] IN BLOOD BY AUTOMATED COUNT: 12.5 % (ref 11.6–15.1)
EST. AVERAGE GLUCOSE BLD GHB EST-MCNC: 100 MG/DL
GFR SERPL CREATININE-BSD FRML MDRD: 93 ML/MIN/1.73SQ M
GLUCOSE SERPL-MCNC: 101 MG/DL (ref 65–140)
HBA1C MFR BLD: 5.1 %
HCT VFR BLD AUTO: 43.5 % (ref 34.8–46.1)
HDLC SERPL-MCNC: 50 MG/DL
HGB BLD-MCNC: 14.2 G/DL (ref 11.5–15.4)
IMM GRANULOCYTES # BLD AUTO: 0.04 THOUSAND/UL (ref 0–0.2)
IMM GRANULOCYTES NFR BLD AUTO: 0 % (ref 0–2)
LDLC SERPL CALC-MCNC: 58 MG/DL (ref 0–100)
LYMPHOCYTES # BLD AUTO: 3.09 THOUSANDS/ΜL (ref 0.6–4.47)
LYMPHOCYTES NFR BLD AUTO: 28 % (ref 14–44)
MCH RBC QN AUTO: 31.1 PG (ref 26.8–34.3)
MCHC RBC AUTO-ENTMCNC: 32.6 G/DL (ref 31.4–37.4)
MCV RBC AUTO: 95 FL (ref 82–98)
MONOCYTES # BLD AUTO: 0.69 THOUSAND/ΜL (ref 0.17–1.22)
MONOCYTES NFR BLD AUTO: 6 % (ref 4–12)
NEUTROPHILS # BLD AUTO: 7.03 THOUSANDS/ΜL (ref 1.85–7.62)
NEUTS SEG NFR BLD AUTO: 64 % (ref 43–75)
NONHDLC SERPL-MCNC: 85 MG/DL
NRBC BLD AUTO-RTO: 0 /100 WBCS
PLATELET # BLD AUTO: 311 THOUSANDS/UL (ref 149–390)
PMV BLD AUTO: 9.7 FL (ref 8.9–12.7)
POTASSIUM SERPL-SCNC: 4.5 MMOL/L (ref 3.5–5.3)
PROT SERPL-MCNC: 6.5 G/DL (ref 6.4–8.2)
RBC # BLD AUTO: 4.56 MILLION/UL (ref 3.81–5.12)
SODIUM SERPL-SCNC: 138 MMOL/L (ref 136–145)
TRIGL SERPL-MCNC: 134 MG/DL
TSH SERPL DL<=0.05 MIU/L-ACNC: 1.15 UIU/ML (ref 0.36–3.74)
WBC # BLD AUTO: 11.02 THOUSAND/UL (ref 4.31–10.16)

## 2021-11-03 PROCEDURE — 80053 COMPREHEN METABOLIC PANEL: CPT

## 2021-11-03 PROCEDURE — 36415 COLL VENOUS BLD VENIPUNCTURE: CPT

## 2021-11-03 PROCEDURE — 83036 HEMOGLOBIN GLYCOSYLATED A1C: CPT

## 2021-11-03 PROCEDURE — 85025 COMPLETE CBC W/AUTO DIFF WBC: CPT

## 2021-11-03 PROCEDURE — 80061 LIPID PANEL: CPT

## 2021-11-03 PROCEDURE — 84443 ASSAY THYROID STIM HORMONE: CPT

## 2021-11-03 PROCEDURE — 87389 HIV-1 AG W/HIV-1&-2 AB AG IA: CPT

## 2021-11-04 LAB — HIV 1+2 AB+HIV1 P24 AG SERPL QL IA: NORMAL

## 2022-01-19 ENCOUNTER — CLINICAL SUPPORT (OUTPATIENT)
Dept: FAMILY MEDICINE CLINIC | Facility: CLINIC | Age: 27
End: 2022-01-19

## 2022-01-19 DIAGNOSIS — Z11.1 ENCOUNTER FOR PPD TEST: Primary | ICD-10-CM

## 2022-01-21 ENCOUNTER — CLINICAL SUPPORT (OUTPATIENT)
Dept: FAMILY MEDICINE CLINIC | Facility: CLINIC | Age: 27
End: 2022-01-21

## 2022-01-21 DIAGNOSIS — Z11.1 ENCOUNTER FOR PPD SKIN TEST READING: Primary | ICD-10-CM

## 2022-01-26 ENCOUNTER — OFFICE VISIT (OUTPATIENT)
Dept: FAMILY MEDICINE CLINIC | Facility: CLINIC | Age: 27
End: 2022-01-26
Payer: COMMERCIAL

## 2022-01-26 VITALS
OXYGEN SATURATION: 97 % | WEIGHT: 172 LBS | TEMPERATURE: 98.3 F | SYSTOLIC BLOOD PRESSURE: 122 MMHG | DIASTOLIC BLOOD PRESSURE: 82 MMHG | RESPIRATION RATE: 18 BRPM | BODY MASS INDEX: 26.07 KG/M2 | HEIGHT: 68 IN | HEART RATE: 82 BPM

## 2022-01-26 DIAGNOSIS — Z11.1 SCREENING FOR TUBERCULOSIS: Primary | ICD-10-CM

## 2022-01-26 DIAGNOSIS — Z00.00 ANNUAL PHYSICAL EXAM: ICD-10-CM

## 2022-01-26 PROCEDURE — 99395 PREV VISIT EST AGE 18-39: CPT | Performed by: FAMILY MEDICINE

## 2022-01-26 PROCEDURE — T1015 CLINIC SERVICE: HCPCS | Performed by: FAMILY MEDICINE

## 2022-01-26 NOTE — PATIENT INSTRUCTIONS

## 2022-01-26 NOTE — PROGRESS NOTES
[facility]    NAME: Keerthi Childers  AGE: 31 y o  SEX: female  : 1995     DATE: 2022     Assessment and Plan:     Problem List Items Addressed This Visit        Other    Screening for tuberculosis - Primary    Relevant Orders    TB Skin Test (Completed)    Annual physical exam          Immunizations and preventive care screenings were discussed with patient today  Appropriate education was printed on patient's after visit summary  Counseling:  Dental Health: discussed importance of regular tooth brushing, flossing, and dental visits  Sexual health: discussed sexually transmitted diseases, partner selection, use of condoms, avoidance of unintended pregnancy, and contraceptive alternatives  · Exercise: the importance of regular exercise/physical activity was discussed  Recommend exercise 3-5 times per week for at least 30 minutes  No follow-ups on file  Chief Complaint:     Chief Complaint   Patient presents with    Physical Exam     Patient is here for physical exam       History of Present Illness:     Adult Annual Physical   Patient here for a comprehensive physical exam  The patient reports no problems  Diet and Physical Activity  · Diet/Nutrition: well balanced diet  · Exercise: 1-2 times a week on average  Depression Screening  PHQ-2/9 Depression Screening         General Health  · Sleep: sleeps well  · Hearing: normal - bilateral   · Vision: no vision problems  · Dental: brushes teeth twice daily  /GYN Health  · Last menstrual period: first week of January   · Contraceptive method: not using since patient wants to get pregnant   · History of STDs?: no      Review of Systems:     Review of Systems   Constitutional: Negative for activity change, appetite change, chills, fatigue and fever     HENT: Negative for congestion, dental problem, ear discharge, ear pain, nosebleeds, sore throat and trouble swallowing  Respiratory: Negative for cough, chest tightness, shortness of breath, wheezing and stridor  Cardiovascular: Negative for chest pain, palpitations and leg swelling  Gastrointestinal: Negative for abdominal distention, abdominal pain, constipation, diarrhea, nausea and vomiting  Genitourinary: Negative for difficulty urinating and dysuria  Musculoskeletal: Negative for arthralgias and back pain  Skin: Negative for color change  Neurological: Negative for dizziness, numbness and headaches  Psychiatric/Behavioral: Negative for confusion  Past Medical History:     Past Medical History:   Diagnosis Date    Gestational diabetes       Past Surgical History:     History reviewed  No pertinent surgical history  Social History:     Social History     Socioeconomic History    Marital status: Single     Spouse name: None    Number of children: None    Years of education: None    Highest education level: None   Occupational History    None   Tobacco Use    Smoking status: Current Some Day Smoker    Smokeless tobacco: Never Used   Vaping Use    Vaping Use: Never used   Substance and Sexual Activity    Alcohol use: Yes    Drug use: No    Sexual activity: Yes     Partners: Male   Other Topics Concern    None   Social History Narrative    None     Social Determinants of Health     Financial Resource Strain: Not on file   Food Insecurity: Not on file   Transportation Needs: Not on file   Physical Activity: Not on file   Stress: Not on file   Social Connections: Not on file   Intimate Partner Violence: Not on file   Housing Stability: Not on file      Family History:     History reviewed  No pertinent family history  Current Medications:     Current Outpatient Medications   Medication Sig Dispense Refill    Prenatal MV-Min-Fe Fum-FA-DHA (PRENATAL 1 PO) Take by mouth       No current facility-administered medications for this visit  Allergies:     No Known Allergies   Physical Exam:     /82   Pulse 82   Temp 98 3 °F (36 8 °C)   Resp 18   Ht 5' 8" (1 727 m)   Wt 78 kg (172 lb)   SpO2 97%   BMI 26 15 kg/m²     Physical Exam  Constitutional:       Appearance: Normal appearance  HENT:      Head: Normocephalic and atraumatic  Right Ear: Tympanic membrane normal       Left Ear: Tympanic membrane normal       Nose: Nose normal       Mouth/Throat:      Mouth: Mucous membranes are moist    Eyes:      Conjunctiva/sclera: Conjunctivae normal    Cardiovascular:      Rate and Rhythm: Normal rate and regular rhythm  Pulses: Normal pulses  Heart sounds: Normal heart sounds  Pulmonary:      Effort: Pulmonary effort is normal       Breath sounds: Normal breath sounds  Abdominal:      General: Abdomen is flat  Bowel sounds are normal       Palpations: Abdomen is soft  Musculoskeletal:         General: Normal range of motion  Skin:     General: Skin is warm  Capillary Refill: Capillary refill takes less than 2 seconds  Neurological:      General: No focal deficit present  Mental Status: She is alert  Psychiatric:         Mood and Affect: Mood normal          Thought Content:  Thought content normal           Roberto Lyle MD   [address]

## 2022-01-28 ENCOUNTER — CLINICAL SUPPORT (OUTPATIENT)
Dept: FAMILY MEDICINE CLINIC | Facility: CLINIC | Age: 27
End: 2022-01-28

## 2022-01-28 DIAGNOSIS — Z11.1 ENCOUNTER FOR PPD SKIN TEST READING: Primary | ICD-10-CM

## 2022-06-07 ENCOUNTER — HOSPITAL ENCOUNTER (EMERGENCY)
Facility: HOSPITAL | Age: 27
Discharge: HOME | End: 2022-06-07
Attending: EMERGENCY MEDICINE | Admitting: EMERGENCY MEDICINE
Payer: COMMERCIAL

## 2022-06-07 VITALS
SYSTOLIC BLOOD PRESSURE: 111 MMHG | TEMPERATURE: 97.9 F | DIASTOLIC BLOOD PRESSURE: 87 MMHG | HEART RATE: 56 BPM | BODY MASS INDEX: 25.01 KG/M2 | HEIGHT: 68 IN | OXYGEN SATURATION: 98 % | RESPIRATION RATE: 18 BRPM | WEIGHT: 165 LBS

## 2022-06-07 DIAGNOSIS — T19.2XXA FOREIGN BODY IN VAGINA: Primary | ICD-10-CM

## 2022-06-07 PROCEDURE — 99281 EMR DPT VST MAYX REQ PHY/QHP: CPT | Performed by: EMERGENCY MEDICINE

## 2022-06-07 PROCEDURE — 99283 EMERGENCY DEPT VISIT LOW MDM: CPT

## 2022-06-10 NOTE — ED PROVIDER NOTES
History  Chief Complaint   Patient presents with    Foreign Body in Vagina     Patient is not sure if she put a tampon in or not today, she went to take it out and it was not there, but her period is over and she is still bleeding  HPI    Patient is a 28 yof who is concerned about a tampon in the vagina  Does not actually recall if there is one in there  On exam, no tampon, will dc  REVIEW OF SYSTEMS  Positive for nothing  All other systems reviewed and are negative unless noted in this section or otherwise in the chart  Physical Exam  Vitals and nursing note reviewed  Constitutional:    Appearance:  Patient is well-developed  No diaphoresis  HENT:   Head: Normocephalic and atraumatic  Right Ear: External ear normal    Left Ear: External ear normal    Nose: No congestion  Eyes:   Conjunctiva/sclera: Conjunctivae normal    Right eye: No discharge  Left eye: No discharge  Extraocular Movements: Extraocular movements intact  Neck:   Vascular: No JVD  Trachea: No tracheal deviation  Cardiovascular:   Rate and Rhythm: Normal rate and regular rhythm  Heart sounds: Normal heart sounds  Pulmonary:   Effort: Pulmonary effort is normal  No respiratory distress  Breath sounds: No wheezing or rales  Abdominal:   Palpations: Abdomen is soft  Tenderness: There is no abdominal tenderness  There is no guarding or rebound  Musculoskeletal:      General: No tenderness  Cervical back: Normal range of motion and neck supple  Skin:  General: Skin is warm and dry  Findings: No erythema or rash  Neurological:   General: No focal deficit present  Mental Status: Alert and oriented to person, place, and time  Motor: No weakness  Psychiatric:      Behavior: Behavior normal       Thought Content: Thought content normal                            Prior to Admission Medications   Prescriptions Last Dose Informant Patient Reported? Taking?    Prenatal MV-Min-Fe Fum-FA-DHA (PRENATAL 1 PO) 6/7/2022 at Unknown time  Yes Yes   Sig: Take by mouth      Facility-Administered Medications: None       Past Medical History:   Diagnosis Date    Gestational diabetes        History reviewed  No pertinent surgical history  History reviewed  No pertinent family history  I have reviewed and agree with the history as documented  E-Cigarette/Vaping    E-Cigarette Use Never User      E-Cigarette/Vaping Substances    Nicotine No     THC No     CBD No     Flavoring No     Other No     Unknown No      Social History     Tobacco Use    Smoking status: Current Some Day Smoker    Smokeless tobacco: Never Used   Vaping Use    Vaping Use: Never used   Substance Use Topics    Alcohol use: Yes    Drug use: No       Review of Systems    Physical Exam  Physical Exam    Vital Signs  ED Triage Vitals [06/07/22 1933]   Temperature Pulse Respirations Blood Pressure SpO2   97 9 °F (36 6 °C) 56 18 111/87 98 %      Temp Source Heart Rate Source Patient Position - Orthostatic VS BP Location FiO2 (%)   Tympanic Monitor Sitting Right arm --      Pain Score       --           Vitals:    06/07/22 1933   BP: 111/87   Pulse: 56   Patient Position - Orthostatic VS: Sitting         Visual Acuity      ED Medications  Medications - No data to display    Diagnostic Studies  Results Reviewed     None                 No orders to display              Procedures  Procedures         ED Course                                             MDM    Disposition  Final diagnoses:   Foreign body in vagina     Time reflects when diagnosis was documented in both MDM as applicable and the Disposition within this note     Time User Action Codes Description Comment    6/7/2022  7:45 PM Pascual Casanova, [facility]  2XXA] Foreign body in vagina       ED Disposition     ED Disposition   Discharge    Condition   Stable    Date/Time   Tue Jun 7, 2022  7:45 PM    Comment   Татьяна Kaur discharge to home/self care                 Follow-up Information Follow up With Specialties Details Why [address], [address] In 1 day  [address], [address] [phone]            Discharge Medication List as of 6/7/2022  7:46 PM      CONTINUE these medications which have NOT CHANGED    Details   Prenatal MV-Min-Fe Fum-FA-DHA (PRENATAL 1 PO) Take by mouth, Historical Med             No discharge procedures on file      PDMP Review     None          ED Provider  Electronically Signed by           Ronak Viera MD  06/09/22 6754

## 2022-06-13 ENCOUNTER — OFFICE VISIT (OUTPATIENT)
Dept: FAMILY MEDICINE CLINIC | Facility: CLINIC | Age: 27
End: 2022-06-13
Payer: COMMERCIAL

## 2022-06-13 VITALS
HEIGHT: 68 IN | DIASTOLIC BLOOD PRESSURE: 60 MMHG | RESPIRATION RATE: 16 BRPM | SYSTOLIC BLOOD PRESSURE: 102 MMHG | WEIGHT: 162.6 LBS | OXYGEN SATURATION: 97 % | TEMPERATURE: 99.4 F | BODY MASS INDEX: 24.64 KG/M2 | HEART RATE: 81 BPM

## 2022-06-13 DIAGNOSIS — N12 PYELONEPHRITIS: ICD-10-CM

## 2022-06-13 DIAGNOSIS — N30.00 ACUTE CYSTITIS WITHOUT HEMATURIA: Primary | ICD-10-CM

## 2022-06-13 PROCEDURE — T1015 CLINIC SERVICE: HCPCS | Performed by: FAMILY MEDICINE

## 2022-06-13 PROCEDURE — 81002 URINALYSIS NONAUTO W/O SCOPE: CPT | Performed by: FAMILY MEDICINE

## 2022-06-13 PROCEDURE — 99213 OFFICE O/P EST LOW 20 MIN: CPT | Performed by: FAMILY MEDICINE

## 2022-06-13 RX ORDER — CIPROFLOXACIN 500 MG/1
TABLET, FILM COATED ORAL
COMMUNITY
Start: 2022-06-11 | End: 2022-06-13 | Stop reason: ALTCHOICE

## 2022-06-13 RX ORDER — CIPROFLOXACIN 500 MG/1
500 TABLET, FILM COATED ORAL 2 TIMES DAILY
COMMUNITY
Start: 2022-06-11 | End: 2022-06-25

## 2022-06-13 NOTE — PATIENT INSTRUCTIONS
Urinary Tract Infection in Older Adults   AMBULATORY CARE:   A urinary tract infection  (UTI) is caused by bacteria that get inside your urinary tract  Your urinary tract includes your kidneys, ureters, bladder, and urethra  Urine is made in your kidneys, and it flows from the ureters to the bladder  Urine leaves the bladder through the urethra  A UTI is more common in your lower urinary tract, which includes your bladder and urethra  Common signs and symptoms include the following:   Fever and chills    Pain or burning when you urinate    Urine that smells bad or looks cloudy, or blood in your urine    Urinating more often or waking from sleep to urinate    Sudden, strong need to urinate    Pain or pressure in your lower abdomen     Leaking urine    Confusion or agitation    Fatigue, shakiness, and weakness    Seek care immediately if:   You are urinating very little or not at all  You are vomiting  You have a high fever with shaking chills  You have side or back pain that gets worse  Call your doctor if:   You have a fever  You are a woman and you have increased white or yellow discharge from your vagina  You do not feel better after 2 days of taking antibiotics  You have questions or concerns about your condition or care  Treatment:  Medicines treat the bacterial infection or decrease pain and burning when you urinate  You may also need medicines to decrease the urge to urinate often  If you have UTIs often (called recurrent UTIs), you may be given antibiotics to take regularly  You will be given directions for when and how to use antibiotics  The goal is to prevent UTIs but not cause antibiotic resistance by using antibiotics too often  Self-care:   Drink liquids as directed  Liquids can help flush bacteria from your urinary tract  Ask how much liquid to drink each day and which liquids are best for you   You may need to drink more liquids than usual to help flush out the bacteria  Do not drink alcohol, caffeine, and citrus juices  These can irritate your bladder and increase your symptoms  Apply heat  on your abdomen for 20 to 30 minutes every 2 hours for as many days as directed  Heat helps decrease discomfort and pressure in your bladder  Prevent a UTI:   Urinate when you feel the urge  Do not hold your urine  Bacteria can grow if urine stays in the bladder too long  It may be helpful to urinate at least every 3 to 4 hours  Urinate after you have sex  to flush away bacteria that can enter your urinary tract during sex  Wear cotton underwear and clothes that are loose  Tight pants and nylon underwear can trap moisture and cause bacteria to grow  Cranberry juice or cranberry supplements  may help prevent UTIs  Your healthcare provider can recommend the right juice or supplement for you  Women should wipe front to back  after urinating or having a bowel movement  This may prevent germs from getting into the urinary tract  Do not douche or use feminine deodorants  These can change the chemical balance in your vagina  You may also be given vaginal estrogen medicine  This medicine helps prevent recurrent UTIs in women who have gone through menopause or are in saul-menopause  Follow up with your doctor as directed:  Write down your questions so you remember to ask them during your visits  © Copyright [employer] 2022 Information is for End User's use only and may not be sold, redistributed or otherwise used for commercial purposes  All illustrations and images included in CareNotes® are the copyrighted property of [employer]  or [employer]   The above information is an  only  It is not intended as medical advice for individual conditions or treatments  Talk to your doctor, nurse or pharmacist before following any medical regimen to see if it is safe and effective for you      Kidney Infection   AMBULATORY CARE:   A kidney infection , or pyelonephritis, is a bacterial infection  The infection usually starts in your bladder or urethra and moves into your kidney  One or both kidneys may be infected  Common signs and symptoms include the following:   Pain in your abdomen, lower back, or sides    Pain or burning when you urinate    A sudden strong urge to urinate or urinating more often than usual    Cloudy or bloody urine    Fever, chills, and fatigue    Nausea and vomiting    Seek care immediately if:   You have a fever and chills  You cannot stop vomiting  You have severe pain in your abdomen, lower back, or sides  Contact your healthcare provider if:   You continue to have a fever after you take antibiotics for 3 days  You have pain when you urinate, even after treatment  Your signs and symptoms return  You have questions or concerns about your condition or care  Treatment:   Antibiotics  treat your bacterial infection  Acetaminophen  decreases pain and fever  It is available without a doctor's order  Ask how much to take and how often to take it  Follow directions  Read the labels of all other medicines you are using to see if they also contain acetaminophen, or ask your doctor or pharmacist  Acetaminophen can cause liver damage if not taken correctly  Do not use more than 4 grams (4,000 milligrams) total of acetaminophen in one day  NSAIDs , such as ibuprofen, help decrease swelling, pain, and fever  This medicine is available with or without a doctor's order  NSAIDs can cause stomach bleeding or kidney problems in certain people  If you take blood thinner medicine, always ask if NSAIDs are safe for you  Always read the medicine label and follow directions  Do not give these medicines to children under 10months of age without direction from your child's healthcare provider  Prescription pain medicine  may be given  Ask how to take this medicine safely  Surgery  may be needed if a ureter is blocked   The ureter is the tube that takes urine from a kidney to the bladder  A blocked ureter can cause repeated kidney infections  Drink liquids as directed: You may need to drink extra liquids to help flush your kidneys and urinary system  Water is the best liquid to drink  Ask your healthcare provider how much liquid to drink each day and which liquids are best for you  Urinate as soon as you feel the urge: This will help flush bacteria from your urinary system  Do not wait or hold your urine for too long  Clean your genital area every day with soap and water:  Wipe from front to back after you urinate or have a bowel movement  Wear cotton underwear  Fabrics such as nylon and polyester can stay damp  This can increase your risk for infection  Urinate within 15 minutes after you have sex  Follow up with your doctor as directed:  Write down your questions so you remember to ask them during your visits  © [employer] 2022 Information is for End User's use only and may not be sold, redistributed or otherwise used for commercial purposes  All illustrations and images included in CareNotes® are the copyrighted property of [employer]  or [employer] Brown   The above information is an  only  It is not intended as medical advice for individual conditions or treatments  Talk to your doctor, nurse or pharmacist before following any medical regimen to see if it is safe and effective for you

## 2022-06-13 NOTE — PROGRESS NOTES
Assessment:      UTI      Plan:  Plan:      1  Medications: ciprofloxacin  2  Maintain adequate hydration  3  Follow up if symptoms not improving, and prn  Subjective:      Kristen Monahan is a 28 y o  female who complains of abnormal smelling urine, foul smelling urine, frequency and nausea for 4 days  Patient also complains of fever  Patient denies back pain, congestion, cough, fever, headache, rhinitis, sorethroat, stomach ache and vaginal discharge  Patient does not have a history of recurrent UTI  Patient does not have a history of pyelonephritis  However, [facility] reads "mild fullness of right upper colleting system but no ureteral calculus seen  Pyelonephritis possible " patient reports improvement of symptoms since Cipro stared; is taking 14-day course  Will check CMP and ua afterward for follow up    The following portions of the patient's history were reviewed and updated as appropriate: allergies, current medications, past family history, past medical history, past social history, past surgical history and problem list     Review of Systems  Gastrointestinal: positive for abdominal pain      Objective:      /60   Pulse 81   Temp 99 4 °F (37 4 °C)   Resp 16   Ht 5' 8" (1 727 m)   Wt 73 8 kg (162 lb 9 6 oz)   LMP 06/06/2022 (Exact Date)   SpO2 97%   BMI 24 72 kg/m²   General: alert and oriented, in no acute distress   Abdomen: soft and tenderness mild in the RLQ abdomen   Back: spine nontender, CVA tenderness absent   : defer exam     Laboratory:   None

## 2022-09-07 ENCOUNTER — TELEPHONE (OUTPATIENT)
Dept: ADMINISTRATIVE | Facility: OTHER | Age: 27
End: 2022-09-07

## 2022-09-07 NOTE — TELEPHONE ENCOUNTER
----- Message from Jocelyn Valerio MA sent at 9/6/2022  2:55 PM EDT -----  09/06/22 2:56 PM    Hello, our patient Popeye Clayton has had Hepatitis C completed/performed  Please assist in updating the patient chart by pulling the Care Everywhere (CE) document  The date of service is 03/7/2019       Thank you,  Jocelyn Valerio MA  [address]

## 2022-09-07 NOTE — TELEPHONE ENCOUNTER
Upon review of the In Basket request we were able to locate, review, and update the patient chart as requested for Hepatitis C   Any additional questions or concerns should be emailed to the Practice Liaisons via [email or web address] email, please do not reply via In Basket      Thank you  Kristopher Currie

## 2022-09-27 ENCOUNTER — HOSPITAL ENCOUNTER (EMERGENCY)
Facility: HOSPITAL | Age: 27
Discharge: HOME | End: 2022-09-27
Attending: EMERGENCY MEDICINE
Payer: COMMERCIAL

## 2022-09-27 VITALS
RESPIRATION RATE: 17 BRPM | SYSTOLIC BLOOD PRESSURE: 151 MMHG | DIASTOLIC BLOOD PRESSURE: 89 MMHG | TEMPERATURE: 97.6 F | OXYGEN SATURATION: 99 % | WEIGHT: 160 LBS | HEART RATE: 80 BPM | BODY MASS INDEX: 24.33 KG/M2

## 2022-09-27 DIAGNOSIS — N92.0 MENORRHAGIA WITH REGULAR CYCLE: Primary | ICD-10-CM

## 2022-09-27 LAB
ABO GROUP BLD: NORMAL
APTT PPP: 27 SECONDS (ref 23–37)
B-HCG SERPL-ACNC: <2 MIU/ML
BACTERIA UR QL AUTO: NORMAL /HPF
BASOPHILS # BLD AUTO: 0.04 THOUSANDS/ΜL (ref 0–0.1)
BASOPHILS NFR BLD AUTO: 1 % (ref 0–1)
BILIRUB UR QL STRIP: NEGATIVE
BLD GP AB SCN SERPL QL: NEGATIVE
CLARITY UR: CLEAR
COLOR UR: YELLOW
EOSINOPHIL # BLD AUTO: 0.13 THOUSAND/ΜL (ref 0–0.61)
EOSINOPHIL NFR BLD AUTO: 2 % (ref 0–6)
ERYTHROCYTE [DISTWIDTH] IN BLOOD BY AUTOMATED COUNT: 11.6 % (ref 11.6–15.1)
EXT PREG TEST URINE: NEGATIVE
EXT. CONTROL ED NAV: NORMAL
GLUCOSE UR STRIP-MCNC: NEGATIVE MG/DL
HCT VFR BLD AUTO: 37.8 % (ref 34.8–46.1)
HGB BLD-MCNC: 12.6 G/DL (ref 11.5–15.4)
HGB UR QL STRIP.AUTO: ABNORMAL
IMM GRANULOCYTES # BLD AUTO: 0.01 THOUSAND/UL (ref 0–0.2)
IMM GRANULOCYTES NFR BLD AUTO: 0 % (ref 0–2)
INR PPP: 0.93 (ref 0.84–1.19)
KETONES UR STRIP-MCNC: NEGATIVE MG/DL
LEUKOCYTE ESTERASE UR QL STRIP: NEGATIVE
LYMPHOCYTES # BLD AUTO: 2.43 THOUSANDS/ΜL (ref 0.6–4.47)
LYMPHOCYTES NFR BLD AUTO: 37 % (ref 14–44)
MCH RBC QN AUTO: 30.9 PG (ref 26.8–34.3)
MCHC RBC AUTO-ENTMCNC: 33.3 G/DL (ref 31.4–37.4)
MCV RBC AUTO: 93 FL (ref 82–98)
MONOCYTES # BLD AUTO: 0.47 THOUSAND/ΜL (ref 0.17–1.22)
MONOCYTES NFR BLD AUTO: 7 % (ref 4–12)
NEUTROPHILS # BLD AUTO: 3.47 THOUSANDS/ΜL (ref 1.85–7.62)
NEUTS SEG NFR BLD AUTO: 53 % (ref 43–75)
NITRITE UR QL STRIP: NEGATIVE
NON-SQ EPI CELLS URNS QL MICRO: NORMAL /HPF
NRBC BLD AUTO-RTO: 0 /100 WBCS
PH UR STRIP.AUTO: 7 [PH]
PLATELET # BLD AUTO: 280 THOUSANDS/UL (ref 149–390)
PMV BLD AUTO: 10.3 FL (ref 8.9–12.7)
PROT UR STRIP-MCNC: NEGATIVE MG/DL
PROTHROMBIN TIME: 12.7 SECONDS (ref 11.6–14.5)
RBC # BLD AUTO: 4.08 MILLION/UL (ref 3.81–5.12)
RBC #/AREA URNS AUTO: NORMAL /HPF
RH BLD: POSITIVE
SP GR UR STRIP.AUTO: 1.02 (ref 1–1.03)
SPECIMEN EXPIRATION DATE: NORMAL
UROBILINOGEN UR QL STRIP.AUTO: 0.2 E.U./DL
WBC # BLD AUTO: 6.55 THOUSAND/UL (ref 4.31–10.16)
WBC #/AREA URNS AUTO: NORMAL /HPF

## 2022-09-27 PROCEDURE — 36415 COLL VENOUS BLD VENIPUNCTURE: CPT | Performed by: EMERGENCY MEDICINE

## 2022-09-27 PROCEDURE — 85610 PROTHROMBIN TIME: CPT | Performed by: EMERGENCY MEDICINE

## 2022-09-27 PROCEDURE — 85730 THROMBOPLASTIN TIME PARTIAL: CPT | Performed by: EMERGENCY MEDICINE

## 2022-09-27 PROCEDURE — 99284 EMERGENCY DEPT VISIT MOD MDM: CPT

## 2022-09-27 PROCEDURE — 99284 EMERGENCY DEPT VISIT MOD MDM: CPT | Performed by: EMERGENCY MEDICINE

## 2022-09-27 PROCEDURE — 86850 RBC ANTIBODY SCREEN: CPT | Performed by: EMERGENCY MEDICINE

## 2022-09-27 PROCEDURE — 85025 COMPLETE CBC W/AUTO DIFF WBC: CPT | Performed by: EMERGENCY MEDICINE

## 2022-09-27 PROCEDURE — 86900 BLOOD TYPING SEROLOGIC ABO: CPT | Performed by: EMERGENCY MEDICINE

## 2022-09-27 PROCEDURE — 84702 CHORIONIC GONADOTROPIN TEST: CPT | Performed by: EMERGENCY MEDICINE

## 2022-09-27 PROCEDURE — 81001 URINALYSIS AUTO W/SCOPE: CPT | Performed by: EMERGENCY MEDICINE

## 2022-09-27 PROCEDURE — 86901 BLOOD TYPING SEROLOGIC RH(D): CPT | Performed by: EMERGENCY MEDICINE

## 2022-09-27 PROCEDURE — 81025 URINE PREGNANCY TEST: CPT | Performed by: EMERGENCY MEDICINE

## 2022-09-27 RX ORDER — TRANEXAMIC ACID 650 MG/1
1300 TABLET ORAL 2 TIMES DAILY
Qty: 20 TABLET | Refills: 0 | Status: SHIPPED | OUTPATIENT
Start: 2022-09-27 | End: 2022-10-02

## 2022-09-27 NOTE — ED PROVIDER NOTES
History  Chief Complaint   Patient presents with    Vaginal Bleeding     Patient states she started with a very heavy period about 2 days ago with cramping  Patient states she was going through pads every 1 5 hours and passing a lot of clots    As of today she states her period is more regular  Patient also admits to taking a pregnancy test a few days prior to her getting her period and it was negative  50-year-old female comes in for evaluation of heavy vaginal bleeding  Patient states her regular menstrual cycle began approximately 2 days ago she had increased cramping and bleeding  Patient states that she has been going through a pad every 1-1/2-2 hours  Became concerned when she passed a large clot this morning  Called her OBGYN who suggested coming to the emergency department for evaluation  Patient had been seen for this as an outpatient already and had blood work and an ultrasound that were unremarkable  Patient denies any nausea vomiting and diarrhea urinary symptoms dizziness or lightheadedness  Patient also notes that she has been trying to get pregnant for some time and has been unable to do so  She has not been ever been on any birth control and is actively trying to get pregnant  History provided by:  Patient   used: No    Vaginal Bleeding  Quality:  Bright red, clots and heavier than menses  Severity:  Moderate  Onset quality:  Sudden  Duration:  2 days  Timing:  Constant  Progression:  Worsening  Chronicity:  New  Menstrual history:  Regular  Possible pregnancy: yes    Context: spontaneously    Ineffective treatments:  None tried  Associated symptoms: dyspareunia    Associated symptoms: no abdominal pain, no back pain, no fatigue, no fever and no nausea    Risk factors: no bleeding disorder, no hx of ectopic pregnancy, no ovarian torsion and does not have unprotected sex        Prior to Admission Medications   Prescriptions Last Dose Informant Patient Reported? Taking? Prenatal MV-Min-Fe Fum-FA-DHA (PRENATAL 1 PO)   Yes No   Sig: Take by mouth      Facility-Administered Medications: None       Past Medical History:   Diagnosis Date    Gestational diabetes        Past Surgical History:   Procedure Laterality Date     SECTION         History reviewed  No pertinent family history  I have reviewed and agree with the history as documented  E-Cigarette/Vaping    E-Cigarette Use Never User      E-Cigarette/Vaping Substances    Nicotine No     THC No     CBD No     Flavoring No     Other No     Unknown No      Social History     Tobacco Use    Smoking status: Current Some Day Smoker    Smokeless tobacco: Never Used   Vaping Use    Vaping Use: Never used   Substance Use Topics    Alcohol use: Yes     Comment: socially    Drug use: No       Review of Systems   Constitutional: Negative for fatigue and fever  HENT: Negative for congestion and ear pain  Eyes: Negative for discharge and redness  Respiratory: Negative for apnea, cough, shortness of breath and wheezing  Cardiovascular: Negative for chest pain  Gastrointestinal: Negative for abdominal pain, diarrhea and nausea  Endocrine: Negative for cold intolerance and polydipsia  Genitourinary: Positive for dyspareunia and vaginal bleeding  Negative for difficulty urinating and hematuria  Musculoskeletal: Negative for arthralgias and back pain  Skin: Negative for color change and rash  Allergic/Immunologic: Negative for environmental allergies and immunocompromised state  Neurological: Negative for numbness and headaches  Hematological: Negative for adenopathy  Does not bruise/bleed easily  Psychiatric/Behavioral: Negative for agitation and behavioral problems  Physical Exam  Physical Exam  Vitals and nursing note reviewed  Constitutional:       Appearance: Normal appearance  She is well-developed  She is not toxic-appearing     HENT:      Head: Normocephalic and atraumatic  Right Ear: Tympanic membrane and external ear normal       Left Ear: Tympanic membrane and external ear normal       Nose: Nose normal  No nasal deformity or rhinorrhea  Mouth/Throat:      Dentition: Normal dentition  Pharynx: Uvula midline  Eyes:      General: Lids are normal          Right eye: No discharge  Left eye: No discharge  Conjunctiva/sclera: Conjunctivae normal       Pupils: Pupils are equal, round, and reactive to light  Neck:      Vascular: No carotid bruit or JVD  Trachea: Trachea normal    Cardiovascular:      Rate and Rhythm: Normal rate and regular rhythm  No extrasystoles are present  Chest Wall: PMI is not displaced  Pulses: Normal pulses  Pulmonary:      Effort: Pulmonary effort is normal  No accessory muscle usage or respiratory distress  Breath sounds: Normal breath sounds  No wheezing, rhonchi or rales  Abdominal:      General: Bowel sounds are normal       Palpations: Abdomen is soft  Abdomen is not rigid  There is no mass  Tenderness: There is no abdominal tenderness  There is no guarding or rebound  Genitourinary:     Comments: Deferred at this time  Patient states her bleeding has now slowed  Musculoskeletal:      Right shoulder: No swelling, deformity or bony tenderness  Normal range of motion  Cervical back: Normal range of motion and neck supple  No deformity, tenderness or bony tenderness  Lymphadenopathy:      Cervical: No cervical adenopathy  Skin:     General: Skin is warm and dry  Findings: No rash  Neurological:      Mental Status: She is alert and oriented to person, place, and time  GCS: GCS eye subscore is 4  GCS verbal subscore is 5  GCS motor subscore is 6  Cranial Nerves: No cranial nerve deficit  Sensory: No sensory deficit  Deep Tendon Reflexes: Reflexes are normal and symmetric     Psychiatric:         Speech: Speech normal          Behavior: Behavior normal  Vital Signs  ED Triage Vitals [09/27/22 1304]   Temperature Pulse Respirations Blood Pressure SpO2   97 6 °F (36 4 °C) 80 17 151/89 99 %      Temp Source Heart Rate Source Patient Position - Orthostatic VS BP Location FiO2 (%)   Temporal Monitor -- -- --      Pain Score       No Pain           Vitals:    09/27/22 1304   BP: 151/89   Pulse: 80         Visual Acuity      ED Medications  Medications - No data to display    Diagnostic Studies  Results Reviewed     Procedure Component Value Units Date/Time    hCG, quantitative [[id number]]  (Normal) Collected: 09/27/22 1326    Lab Status: Final result Specimen: Blood from Arm, Right Updated: 09/27/22 1403     HCG, Quant <2 mIU/mL     Narrative:       Expected Ranges:     Approximate               Approximate HCG  Gestation age          Concentration ( mIU/mL)  _____________          ______________________   Nanidni Graven                      HCG values  0 2-1                       5-50  1-2                           2-3                         100-5000  3-4                         500-74145  4-5                         1000-90188  5-6                         [id number]  6-8                         [id number]  8-12                        [id number]      Urine Microscopic [[id number]]  (Normal) Collected: 09/27/22 1326    Lab Status: Final result Specimen: Urine, Clean Catch Updated: 09/27/22 1346     RBC, UA 1-2 /hpf      WBC, UA 0-1 /hpf      Epithelial Cells Occasional /hpf      Bacteria, UA Occasional /hpf     Protime-INR [[id number]]  (Normal) Collected: 09/27/22 1326    Lab Status: Final result Specimen: Blood from Arm, Right Updated: 09/27/22 1346     Protime 12 7 seconds      INR 0 93    APTT [[id number]]  (Normal) Collected: 09/27/22 1326    Lab Status: Final result Specimen: Blood from Arm, Right Updated: 09/27/22 1346     PTT 27 seconds     UA w Reflex to Microscopic w Reflex to Culture [[id number]]  (Abnormal) Collected: 09/27/22 1326    Lab Status: Final result Specimen: Urine, Clean Catch Updated: 09/27/22 1345     Color, UA Yellow     Clarity, UA Clear     Specific [place] Level, UA 1 020     pH, UA 7 0     Leukocytes, UA Negative     Nitrite, UA Negative     Protein, UA Negative mg/dl      Glucose, UA Negative mg/dl      Ketones, UA Negative mg/dl      Urobilinogen, UA 0 2 E U /dl      Bilirubin, UA Negative     Occult Blood, UA Trace-Intact    CBC and differential [[id number]] Collected: 09/27/22 1326    Lab Status: Final result Specimen: Blood from Arm, Right Updated: 09/27/22 1335     WBC 6 55 Thousand/uL      RBC 4 08 Million/uL      Hemoglobin 12 6 g/dL      Hematocrit 37 8 %      MCV 93 fL      MCH 30 9 pg      MCHC 33 3 g/dL      RDW 11 6 %      MPV 10 3 fL      Platelets 576 Thousands/uL      nRBC 0 /100 WBCs      Neutrophils Relative 53 %      Immat GRANS % 0 %      Lymphocytes Relative 37 %      Monocytes Relative 7 %      Eosinophils Relative 2 %      Basophils Relative 1 %      Neutrophils Absolute 3 47 Thousands/µL      Immature Grans Absolute 0 01 Thousand/uL      Lymphocytes Absolute 2 43 Thousands/µL      Monocytes Absolute 0 47 Thousand/µL      Eosinophils Absolute 0 13 Thousand/µL      Basophils Absolute 0 04 Thousands/µL     POCT pregnancy, urine [[id number]]  (Normal) Resulted: 09/27/22 1315    Lab Status: Final result Updated: 09/27/22 1315     EXT PREG TEST UR (Ref: Negative) Negative     Control Valid                 No orders to display              Procedures  Procedures         ED Course                                             MDM  Number of Diagnoses or Management Options  Menorrhagia with regular cycle: new and requires workup     Amount and/or Complexity of Data Reviewed  Clinical lab tests: ordered and reviewed  Tests in the medicine section of CPT®: ordered and reviewed    Risk of Complications, Morbidity, and/or Mortality  General comments: Differential diagnosis includes menorrhagia, dysfunctional uterine bleeding, menstrual cycle, less likely ectopic or are miscarriage  Discussed with patient that blood work today is within normal limits  I will start her on TXA for 5 days  She should follow up with her regular OB for next steps discussed strict return to the Emergency Department instructions    Patient Progress  Patient progress: stable      Disposition  Final diagnoses:   Menorrhagia with regular cycle     Time reflects when diagnosis was documented in both MDM as applicable and the Disposition within this note     Time User Action Codes Description Comment    9/27/2022  2:04 PM Chad Pacheco Add [N92 0] Menorrhagia with regular cycle       ED Disposition     ED Disposition   Discharge    Condition   Stable    Date/Time   Tue Sep 27, 2022  2:04 PM    Comment   Madeline Broaden discharge to home/self care  Follow-up Information     Follow up With Specialties Details Why Contact Info    [facility] ob [facility]    [place], [address] [facility]          Discharge Medication List as of 9/27/2022  2:11 PM      START taking these medications    Details   Tranexamic Acid (Lysteda) 650 MG TABS Take 2 tablets (1,300 mg total) by mouth 2 (two) times a day for 5 days, Starting Tue 9/27/2022, Until Sun 10/2/2022, Normal         CONTINUE these medications which have NOT CHANGED    Details   Prenatal MV-Min-Fe Fum-FA-DHA (PRENATAL 1 PO) Take by mouth, Historical Med             No discharge procedures on file      PDMP Review     None          ED Provider  Electronically Signed by           Renetta Wheeler DO  09/27/22 4098

## 2022-10-12 PROBLEM — Z11.1 SCREENING FOR TUBERCULOSIS: Status: RESOLVED | Noted: 2022-01-26 | Resolved: 2022-10-12

## 2022-10-20 ENCOUNTER — OFFICE VISIT (OUTPATIENT)
Dept: FAMILY MEDICINE CLINIC | Facility: CLINIC | Age: 27
End: 2022-10-20
Payer: COMMERCIAL

## 2022-10-20 VITALS
DIASTOLIC BLOOD PRESSURE: 70 MMHG | BODY MASS INDEX: 24.95 KG/M2 | OXYGEN SATURATION: 98 % | WEIGHT: 164.6 LBS | RESPIRATION RATE: 20 BRPM | HEIGHT: 68 IN | SYSTOLIC BLOOD PRESSURE: 100 MMHG | HEART RATE: 83 BPM | TEMPERATURE: 99.1 F

## 2022-10-20 DIAGNOSIS — R63.2 EATING, EXCESSIVE INDULGENCE: Primary | ICD-10-CM

## 2022-10-20 DIAGNOSIS — F41.9 ANXIETY: ICD-10-CM

## 2022-10-20 DIAGNOSIS — Z72.0 TOBACCO USE: ICD-10-CM

## 2022-10-20 PROCEDURE — 99213 OFFICE O/P EST LOW 20 MIN: CPT | Performed by: FAMILY MEDICINE

## 2022-10-20 PROCEDURE — T1015 CLINIC SERVICE: HCPCS | Performed by: FAMILY MEDICINE

## 2022-10-20 RX ORDER — AMOXICILLIN 500 MG/1
CAPSULE ORAL
COMMUNITY
Start: 2022-10-15

## 2022-10-20 NOTE — PROGRESS NOTES
FAMILY MEDICINE OFFICE VISIT  [facility]      NAME: Keerthi Childers  AGE: 28 y o  SEX: female    DATE OF ENCOUNTER: 10/20/2022    Assessment and Plan     Used ewa in the past for tobacco cessation  Heart racing with it now  Stated smoking now  1  Eating, excessive indulgence  -     Lipid Panel with Direct LDL reflex; Future  -     CBC and Platelet; Future  -     Glucose, fasting; Future  -     TSH, 3rd generation with Free T4 reflex; Future    2  Anxiety    3   Tobacco use          Chief Complaint     Chief Complaint   Patient presents with   • discuss weight loss       History of Present Illness     HPI    The following portions of the patient's history were reviewed and updated as appropriate: allergies, current medications, past family history, past medical history, past social history, past surgical history and problem list     Review of Systems     Review of Systems    Active Problem List     Patient Active Problem List   Diagnosis   • Anxiety   • Acne vulgaris   • Annual physical exam       Objective     /70   Pulse 83   Temp 99 1 °F (37 3 °C)   Resp 20   Ht 5' 8" (1 727 m)   Wt 74 7 kg (164 lb 9 6 oz)   SpO2 98%   BMI 25 03 kg/m²     Physical Exam    Pertinent Laboratory/Diagnostic Studies:  {HKAMBLABLINKS:07972}  {PQXWPN2OW:38043}      Current Medications     Current Outpatient Medications:   •  amoxicillin (AMOXIL) 500 mg capsule, take 1 capsule by mouth every 8 hours until finished, Disp: , Rfl:   •  Prenatal MV-Min-Fe Fum-FA-DHA (PRENATAL 1 PO), Take by mouth, Disp: , Rfl:     Health Maintenance     Health Maintenance   Topic Date Due   • Pneumococcal Vaccine: Pediatrics (0 to 5 Years) and At-Risk Patients (6 to 59 Years) (1 - PCV) Never done   • BMI: Followup Plan  Never done   • Cervical Cancer Screening  Never done   • Depression Screening  11/18/2021   • COVID-19 Vaccine (3 - Booster for Pfizer series) 04/05/2022   • Influenza Vaccine (1) 09/01/2022   • Annual Physical  01/26/2023   • BMI: Adult  10/20/2023   • DTaP,Tdap,and Td Vaccines (2 - Td or Tdap) 07/22/2029   • HIV Screening  Completed   • Hepatitis C Screening  Completed   • HIB Vaccine  Aged Out   • Hepatitis B Vaccine  Aged Out   • IPV Vaccine  Aged Out   • Hepatitis A Vaccine  Aged Out   • Meningococcal ACWY Vaccine  Aged Out   • HPV Vaccine  Aged Out     Immunization History   Administered Date(s) Administered   • COVID-19 PFIZER VACCINE 0 3 ML IM 10/15/2021, 11/05/2021   • INFLUENZA 10/21/2019   • TD (adult) Preservative Free 02/28/2016   • Tdap 07/22/2019   • Tuberculin Skin Test-PPD Intradermal 11/18/2020, 12/02/2020, 12/14/2020, 01/19/2022, 01/26/2022           Karuna Loco MD   Family Medicine  PGY-2  10/20/2022 3:19 PM systems reviewed and are negative  Active Problem List     Patient Active Problem List   Diagnosis   • Anxiety   • Acne vulgaris   • Annual physical exam       Objective     /70   Pulse 83   Temp 99 1 °F (37 3 °C)   Resp 20   Ht 5' 8" (1 727 m)   Wt 74 7 kg (164 lb 9 6 oz)   SpO2 98%   BMI 25 03 kg/m²     Physical Exam  Vitals and nursing note reviewed  Exam conducted with a chaperone present  Constitutional:       General: She is not in acute distress  Appearance: Normal appearance  HENT:      Head: Normocephalic and atraumatic  Right Ear: External ear normal       Left Ear: External ear normal       Nose: Nose normal  No congestion or rhinorrhea  Mouth/Throat:      Mouth: Mucous membranes are moist       Pharynx: Oropharynx is clear  Eyes:      Extraocular Movements: Extraocular movements intact  Conjunctiva/sclera: Conjunctivae normal    Cardiovascular:      Rate and Rhythm: Normal rate and regular rhythm  Pulses: Normal pulses  Heart sounds: Normal heart sounds  No murmur heard  Pulmonary:      Effort: Pulmonary effort is normal       Breath sounds: Normal breath sounds  No wheezing  Abdominal:      General: Abdomen is flat  Bowel sounds are normal       Palpations: Abdomen is soft  Tenderness: There is no abdominal tenderness  Musculoskeletal:         General: Normal range of motion  Cervical back: Normal range of motion and neck supple  Right lower leg: No edema  Left lower leg: No edema  Skin:     General: Skin is warm and dry  Capillary Refill: Capillary refill takes less than 2 seconds  Neurological:      General: No focal deficit present  Mental Status: She is alert and oriented to person, place, and time             Current Medications     Current Outpatient Medications:   •  amoxicillin (AMOXIL) 500 mg capsule, take 1 capsule by mouth every 8 hours until finished, Disp: , Rfl:   •  Prenatal MV-Min-Fe Fum-FA-DHA (PRENATAL 1 PO), Take by mouth, Disp: , Rfl:     Health Maintenance     Health Maintenance   Topic Date Due   • Pneumococcal Vaccine: Pediatrics (0 to 5 Years) and At-Risk Patients (6 to 59 Years) (1 - PCV) Never done   • BMI: Followup Plan  Never done   • Cervical Cancer Screening  Never done   • Depression Screening  11/18/2021   • COVID-19 Vaccine (3 - Booster for Pfizer series) 04/05/2022   • Influenza Vaccine (1) 09/01/2022   • Annual Physical  01/26/2023   • BMI: Adult  10/20/2023   • DTaP,Tdap,and Td Vaccines (2 - Td or Tdap) 07/22/2029   • HIV Screening  Completed   • Hepatitis C Screening  Completed   • HIB Vaccine  Aged Out   • Hepatitis B Vaccine  Aged Out   • IPV Vaccine  Aged Out   • Hepatitis A Vaccine  Aged Out   • Meningococcal ACWY Vaccine  Aged Out   • HPV Vaccine  Aged Out     Immunization History   Administered Date(s) Administered   • COVID-19 PFIZER VACCINE 0 3 ML IM 10/15/2021, 11/05/2021   • INFLUENZA 10/21/2019   • TD (adult) Preservative Free 02/28/2016   • Tdap 07/22/2019   • Tuberculin Skin Test-PPD Intradermal 11/18/2020, 12/02/2020, 12/14/2020, 01/19/2022, 01/26/2022           Rico Coto MD   Family Medicine  PGY-2  10/24/2022 4:42 PM

## 2022-12-10 ENCOUNTER — VBI (OUTPATIENT)
Dept: ADMINISTRATIVE | Facility: OTHER | Age: 27
End: 2022-12-10

## 2023-01-04 ENCOUNTER — OFFICE VISIT (OUTPATIENT)
Dept: FAMILY MEDICINE CLINIC | Facility: CLINIC | Age: 28
End: 2023-01-04

## 2023-01-04 VITALS
HEIGHT: 68 IN | WEIGHT: 175.4 LBS | OXYGEN SATURATION: 97 % | SYSTOLIC BLOOD PRESSURE: 112 MMHG | DIASTOLIC BLOOD PRESSURE: 70 MMHG | TEMPERATURE: 97.6 F | RESPIRATION RATE: 20 BRPM | HEART RATE: 75 BPM | BODY MASS INDEX: 26.58 KG/M2

## 2023-01-04 DIAGNOSIS — Z02.89 ENCOUNTER FOR PHYSICAL EXAMINATION RELATED TO EMPLOYMENT: Primary | ICD-10-CM

## 2023-01-04 DIAGNOSIS — Z28.21 IMMUNIZATION REFUSED: ICD-10-CM

## 2023-01-04 DIAGNOSIS — Z87.891 RECENTLY QUIT USING TOBACCO: ICD-10-CM

## 2023-01-04 DIAGNOSIS — Z11.1 ENCOUNTER FOR PPD TEST: ICD-10-CM

## 2023-01-04 NOTE — PROGRESS NOTES
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[place]  No risk factors for communicable diseases noted  2  Encounter for PPD test  Comments:  adminestered PPD today    3  Recently quit using tobacco  Comments:  Quit 2 weeks ago  2-5 cigarettes a day for 10years  Recommend pneumococcal vaccine    4  Immunization refused  Comments:  would like to get vaccines during follwo up visit  Patient is 79-year-old female with no significant past medical history presented to the office for employment physical   I placed the PPD today  Discussed regarding him Shyanne  Patient agreed to get HPV, Prevnar 20, influenza vaccines during follow-up visit  Chief Complaint     Chief Complaint   Patient presents with   • Physical Exam     2 step ppd for employment       History of Present Illness     Patient is 79-year-old female with no significant past medical history presented to the office for employment physical   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[employer]  Denies any concerns at this point  Never tested positive for PPD  Did not receive BCG nation in the past        The following portions of the patient's history were reviewed and updated as appropriate: allergies, current medications, past family history, past medical history, past social history, past surgical history and problem list     Review of Systems     Review of Systems   Constitutional: Negative for activity change, appetite change, chills, fever and unexpected weight change  HENT: Negative for ear pain, hearing loss, mouth sores, rhinorrhea, sneezing and sore throat  Eyes: Negative for pain and visual disturbance  Respiratory: Negative for cough, chest tightness, shortness of breath and wheezing      Cardiovascular: Negative for chest pain and palpitations  Gastrointestinal: Negative for abdominal pain, constipation, diarrhea, nausea and vomiting  Genitourinary: Negative for difficulty urinating  Musculoskeletal: Negative for arthralgias and back pain  Skin: Negative for color change and rash  Neurological: Negative for dizziness and light-headedness  Psychiatric/Behavioral: The patient is not nervous/anxious  All other systems reviewed and are negative  Active Problem List     Patient Active Problem List   Diagnosis   • Anxiety   • Acne vulgaris   • Annual physical exam       Objective     /70 (BP Location: Left arm, Patient Position: Sitting, Cuff Size: Standard)   Pulse 75   Temp 97 6 °F (36 4 °C) (Tympanic)   Resp 20   Ht 5' 8" (1 727 m)   Wt 79 6 kg (175 lb 6 4 oz)   SpO2 97%   BMI 26 67 kg/m²     Physical Exam  Vitals and nursing note reviewed  Exam conducted with a chaperone present  Constitutional:       General: She is not in acute distress  Appearance: Normal appearance  HENT:      Head: Normocephalic and atraumatic  Right Ear: External ear normal       Left Ear: External ear normal       Nose: Nose normal  No congestion or rhinorrhea  Mouth/Throat:      Mouth: Mucous membranes are moist       Pharynx: Oropharynx is clear  Eyes:      Conjunctiva/sclera: Conjunctivae normal       Pupils: Pupils are equal, round, and reactive to light  Cardiovascular:      Rate and Rhythm: Normal rate and regular rhythm  Pulses: Normal pulses  Heart sounds: Normal heart sounds  No murmur heard  Pulmonary:      Effort: Pulmonary effort is normal       Breath sounds: Normal breath sounds  No wheezing  Abdominal:      General: Abdomen is flat  Bowel sounds are normal       Palpations: Abdomen is soft  Tenderness: There is no abdominal tenderness  Musculoskeletal:      Cervical back: Neck supple  Right lower leg: No edema  Left lower leg: No edema  Skin:     General: Skin is warm and dry  Capillary Refill: Capillary refill takes less than 2 seconds  Neurological:      General: No focal deficit present  Mental Status: She is alert and oriented to person, place, and time  Psychiatric:         Mood and Affect: Mood normal          Behavior: Behavior normal          Current Medications     Current Outpatient Medications:   •  amoxicillin (AMOXIL) 500 mg capsule, take 1 capsule by mouth every 8 hours until finished, Disp: , Rfl:     Health Maintenance     Health Maintenance   Topic Date Due   • Pneumococcal Vaccine: Pediatrics (0 to 5 Years) and At-Risk Patients (6 to 59 Years) (1 - PCV) Never done   • COVID-19 Vaccine (3 - Booster for Pfizer series) 12/31/2021   • Influenza Vaccine (1) 09/01/2022   • Annual Physical  01/26/2023   • Hepatitis B Vaccine (1 of 3 - 3-dose series) 02/04/2023 (Originally 1995)   • Cervical Cancer Screening  12/08/2025 (Originally 4/11/2015)   • Depression Screening  01/04/2024   • BMI: Followup Plan  01/04/2024   • BMI: Adult  01/04/2024   • DTaP,Tdap,and Td Vaccines (2 - Td or Tdap) 07/22/2029   • HIV Screening  Completed   • Hepatitis C Screening  Completed   • HIB Vaccine  Aged Out   • IPV Vaccine  Aged Out   • Hepatitis A Vaccine  Aged Out   • Meningococcal ACWY Vaccine  Aged Out   • HPV Vaccine  Aged Out     Immunization History   Administered Date(s) Administered   • COVID-19 PFIZER VACCINE 0 3 ML IM 10/15/2021, 11/05/2021   • INFLUENZA 10/21/2019   • TD (adult) Preservative Free 02/28/2016   • Tdap 07/22/2019   • Tuberculin Skin Test-PPD Intradermal 11/18/2020, 12/02/2020, 12/14/2020, 01/19/2022, 01/26/2022     BMI Counseling: Body mass index is 26 67 kg/m²   The BMI is above normal  Nutrition recommendations include decreasing portion sizes, encouraging healthy choices of fruits and vegetables, decreasing fast food intake, consuming healthier snacks, limiting drinks that contain sugar, moderation in carbohydrate intake, increasing intake of lean protein, reducing intake of saturated and trans fat and reducing intake of cholesterol  Exercise recommendations include exercising 3-5 times per week and strength training exercises  No pharmacotherapy was ordered  Rationale for BMI follow-up plan is due to patient being overweight or obese  Tobacco Cessation Counseling: Tobacco cessation counseling was provided  The patient is sincerely urged to quit consumption of tobacco  She is ready to quit tobacco  Medication options and side effects of medication discussed  Patient agreed to medication            Pedro Addison MD   Family Medicine  PGY-2  1/4/2023 2:06 PM

## 2023-01-06 ENCOUNTER — CLINICAL SUPPORT (OUTPATIENT)
Dept: FAMILY MEDICINE CLINIC | Facility: CLINIC | Age: 28
End: 2023-01-06

## 2023-01-06 DIAGNOSIS — Z23 ENCOUNTER FOR IMMUNIZATION: Primary | ICD-10-CM

## 2023-01-06 DIAGNOSIS — Z11.1 SCREENING FOR TUBERCULOSIS: ICD-10-CM

## 2023-01-06 LAB
INDURATION: 0 MM
TB SKIN TEST: NEGATIVE

## 2023-01-11 ENCOUNTER — CLINICAL SUPPORT (OUTPATIENT)
Dept: FAMILY MEDICINE CLINIC | Facility: CLINIC | Age: 28
End: 2023-01-11

## 2023-01-11 DIAGNOSIS — Z11.1 ENCOUNTER FOR PPD TEST: Primary | ICD-10-CM

## 2023-01-13 ENCOUNTER — CLINICAL SUPPORT (OUTPATIENT)
Dept: FAMILY MEDICINE CLINIC | Facility: CLINIC | Age: 28
End: 2023-01-13

## 2023-01-13 DIAGNOSIS — Z11.1 SCREENING FOR TUBERCULOSIS: ICD-10-CM

## 2023-01-13 LAB
INDURATION: 0 MM
TB SKIN TEST: NEGATIVE

## 2023-05-31 ENCOUNTER — CLINICAL SUPPORT (OUTPATIENT)
Dept: FAMILY MEDICINE CLINIC | Facility: CLINIC | Age: 28
End: 2023-05-31
Payer: COMMERCIAL

## 2023-05-31 DIAGNOSIS — Z23 ENCOUNTER FOR IMMUNIZATION: Primary | ICD-10-CM

## 2023-05-31 PROCEDURE — 86580 TB INTRADERMAL TEST: CPT

## 2023-07-06 ENCOUNTER — APPOINTMENT (OUTPATIENT)
Dept: FAMILY MEDICINE CLINIC | Facility: CLINIC | Age: 28
End: 2023-07-06
Payer: COMMERCIAL

## 2023-07-06 DIAGNOSIS — Z11.59 NEED FOR HEPATITIS B SCREENING TEST: ICD-10-CM

## 2023-07-06 PROCEDURE — 86706 HEP B SURFACE ANTIBODY: CPT | Performed by: FAMILY MEDICINE

## 2023-07-06 PROCEDURE — T1015 CLINIC SERVICE: HCPCS

## 2023-07-07 LAB — HBV SURFACE AB SER-ACNC: <3 MIU/ML

## 2023-07-24 ENCOUNTER — CLINICAL SUPPORT (OUTPATIENT)
Dept: FAMILY MEDICINE CLINIC | Facility: CLINIC | Age: 28
End: 2023-07-24
Payer: COMMERCIAL

## 2023-07-24 DIAGNOSIS — Z01.84 IMMUNITY STATUS TESTING: ICD-10-CM

## 2023-07-24 DIAGNOSIS — Z01.84 IMMUNITY STATUS TESTING: Primary | ICD-10-CM

## 2023-07-24 DIAGNOSIS — Z23 NEED FOR HEPATITIS B BOOSTER VACCINATION: Primary | ICD-10-CM

## 2023-07-24 DIAGNOSIS — Z00.00 ANNUAL PHYSICAL EXAM: Primary | ICD-10-CM

## 2023-07-24 LAB
MEV IGG SER QL IA: NORMAL
MUV IGG SER QL IA: NORMAL
RUBV IGG SERPL IA-ACNC: 125.7 IU/ML
VZV IGG SER QL IA: NORMAL

## 2023-07-24 PROCEDURE — 86787 VARICELLA-ZOSTER ANTIBODY: CPT | Performed by: FAMILY MEDICINE

## 2023-07-24 PROCEDURE — 86765 RUBEOLA ANTIBODY: CPT | Performed by: FAMILY MEDICINE

## 2023-07-24 PROCEDURE — 86735 MUMPS ANTIBODY: CPT | Performed by: FAMILY MEDICINE

## 2023-07-24 PROCEDURE — 86762 RUBELLA ANTIBODY: CPT | Performed by: FAMILY MEDICINE

## 2023-08-21 ENCOUNTER — TELEPHONE (OUTPATIENT)
Dept: FAMILY MEDICINE CLINIC | Facility: CLINIC | Age: 28
End: 2023-08-21

## 2023-08-21 NOTE — TELEPHONE ENCOUNTER
Hello, this is [employer]. My date of birth is [birth year]. My phone number is [phone]. I just got your e-mail about having to e-mail or I'm sorry, your voicemail about having to e-mail me my paperwork or that I needed. I was going to  today, but I can't right now. My e-mail is [email or web address] Thanks bye. Can someone email patient vaccine record I printed  to her email thanks!

## 2023-08-21 NOTE — TELEPHONE ENCOUNTER
Hi, my name is [employer]. My phone number is [phone]. My date of birth is [birth year]. I'm calling about my rubella. I'm shy when I go into my chart and stuff, like I don't see it. 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 I'm pretty sure I got it. I don't know, I just need to figure it out. But if you can call me back would be great. Thanks.  Norm.

## 2023-08-21 NOTE — TELEPHONE ENCOUNTER
Lm for patient to call office back to provide us with a good fax # or email address to send records to.

## 2023-09-18 ENCOUNTER — CLINICAL SUPPORT (OUTPATIENT)
Dept: FAMILY MEDICINE CLINIC | Facility: CLINIC | Age: 28
End: 2023-09-18

## 2023-09-18 DIAGNOSIS — Z23 ENCOUNTER FOR IMMUNIZATION: Primary | ICD-10-CM

## 2023-11-10 ENCOUNTER — HOSPITAL ENCOUNTER (EMERGENCY)
Facility: HOSPITAL | Age: 28
Discharge: HOME | End: 2023-11-10
Attending: EMERGENCY MEDICINE | Admitting: EMERGENCY MEDICINE
Payer: COMMERCIAL

## 2023-11-10 VITALS
DIASTOLIC BLOOD PRESSURE: 88 MMHG | RESPIRATION RATE: 17 BRPM | HEART RATE: 75 BPM | TEMPERATURE: 97.6 F | OXYGEN SATURATION: 98 % | SYSTOLIC BLOOD PRESSURE: 141 MMHG

## 2023-11-10 DIAGNOSIS — R21 RASH AND NONSPECIFIC SKIN ERUPTION: Primary | ICD-10-CM

## 2023-11-10 PROCEDURE — 99283 EMERGENCY DEPT VISIT LOW MDM: CPT | Performed by: EMERGENCY MEDICINE

## 2023-11-10 PROCEDURE — 99282 EMERGENCY DEPT VISIT SF MDM: CPT

## 2023-11-10 NOTE — DISCHARGE INSTRUCTIONS
Please use steroid cream two times per day. Please also use moisturizing cream such as eucerin cream.    Please follow up with your primary care doctor within one week.

## 2023-11-11 NOTE — ED PROVIDER NOTES
History  Chief Complaint   Patient presents with    Rash     Patient complains of a rash to her left lower leg that started about a month ago. Patient complains of itching. HPI    62-year-old female with no significant past medical history who presents for evaluation of a rash. Patient states she has had a rash to her left lower leg for the past month. She states rash is itchy. She occasionally has spread of the rash to the arms. Denies any pain. Denies fevers or chills. Denies mucosal lesions. Denies chest pain, shortness of breath, cough, congestion, abdominal pain, nausea, vomiting, or diarrhea. Patient has not taken any medications for this. Denies any new exposures or products. Denies any outdoor exposures. None       Past Medical History:   Diagnosis Date    Gestational diabetes        Past Surgical History:   Procedure Laterality Date     SECTION         History reviewed. No pertinent family history. I have reviewed and agree with the history as documented. E-Cigarette/Vaping    E-Cigarette Use Never User      E-Cigarette/Vaping Substances    Nicotine No     THC No     CBD No     Flavoring No     Other No     Unknown No      Social History     Tobacco Use    Smoking status: Some Days    Smokeless tobacco: Never   Vaping Use    Vaping Use: Never used   Substance Use Topics    Alcohol use: Not Currently     Comment: socially    Drug use: No       Review of Systems   Constitutional:  Negative for appetite change, chills and fever. HENT:  Negative for congestion, rhinorrhea and sore throat. Respiratory:  Negative for cough and shortness of breath. Cardiovascular:  Negative for chest pain. Gastrointestinal:  Negative for abdominal pain, constipation, diarrhea, nausea and vomiting. Musculoskeletal:  Negative for arthralgias and myalgias. Skin:  Positive for rash. Neurological:  Negative for dizziness, weakness, light-headedness, numbness and headaches.    All other systems reviewed and are negative. Physical Exam  Physical Exam  Vitals and nursing note reviewed. Constitutional:       General: She is not in acute distress. Appearance: Normal appearance. She is well-developed and normal weight. She is not ill-appearing, toxic-appearing or diaphoretic. HENT:      Head: Normocephalic and atraumatic. Right Ear: External ear normal.      Left Ear: External ear normal.      Nose: Nose normal.      Mouth/Throat:      Mouth: Mucous membranes are moist.      Pharynx: Oropharynx is clear. Eyes:      Extraocular Movements: Extraocular movements intact. Conjunctiva/sclera: Conjunctivae normal.   Cardiovascular:      Rate and Rhythm: Normal rate and regular rhythm. Pulses: Normal pulses. Heart sounds: Normal heart sounds. No murmur heard. No friction rub. No gallop. Pulmonary:      Effort: Pulmonary effort is normal. No respiratory distress. Breath sounds: Normal breath sounds. No wheezing or rales. Abdominal:      General: There is no distension. Palpations: Abdomen is soft. Tenderness: There is no abdominal tenderness. There is no guarding or rebound. Musculoskeletal:         General: No tenderness. Cervical back: Neck supple. Skin:     General: Skin is warm and dry. Coloration: Skin is not pale. Findings: Rash present. No erythema. Comments: Erythematous blanching slightly raised rash to the left lower leg, rash is blanching, no tenderness. Multiple small scattered erythematous macules/papules to the right forearm. Neurological:      General: No focal deficit present. Mental Status: She is alert and oriented to person, place, and time. Cranial Nerves: No cranial nerve deficit. Sensory: No sensory deficit. Motor: No weakness.    Psychiatric:         Mood and Affect: Mood normal.         Behavior: Behavior normal.         Vital Signs  ED Triage Vitals [11/10/23 1641]   Temperature Pulse Respirations Blood Pressure SpO2   97.6 °F (36.4 °C) 75 17 141/88 98 %      Temp Source Heart Rate Source Patient Position - Orthostatic VS BP Location FiO2 (%)   Temporal Monitor -- -- --      Pain Score       No Pain           Vitals:    11/10/23 1641   BP: 141/88   Pulse: 75         Visual Acuity      ED Medications  Medications - No data to display    Diagnostic Studies  Results Reviewed       None                   No orders to display              Procedures  Procedures         ED Course                               SBIRT 20yo+      Flowsheet Row Most Recent Value   Initial Alcohol Screen: US AUDIT-C     1. How often do you have a drink containing alcohol? 0 Filed at: 11/10/2023 1645   2. How many drinks containing alcohol do you have on a typical day you are drinking? 0 Filed at: 11/10/2023 1645   3a. Male UNDER 65: How often do you have five or more drinks on one occasion? 0 Filed at: 11/10/2023 1645   3b. FEMALE Any Age, or MALE 65+: How often do you have 4 or more drinks on one occassion? 0 Filed at: 11/10/2023 1645   Audit-C Score 0 Filed at: 11/10/2023 1645   KRISTA: How many times in the past year have you. .. Used an illegal drug or used a prescription medication for non-medical reasons? Never Filed at: 11/10/2023 1645                      Medical Decision Making  Risk  Prescription drug management. 69-year-old female with no significant past medical history who presents for evaluation of a rash for the left lower extremity for one month, associated with pruritus, no tenderness or pain, no fevers. Likely contact dermatitis versus atopic dermatitis. Will treat with steroid cream.  No signs of infection or cellulitis at this time. We will have patient follow-up with her primary care doctor. Discussed strict return precautions. Patient expressed understanding and was agreeable for discharge.        Disposition  Final diagnoses:   Rash and nonspecific skin eruption     Time reflects when diagnosis was documented in both MDM as applicable and the Disposition within this note       Time User Action Codes Description Comment    11/10/2023  4:54 PM Tamea Pellant Add [R21] Rash and nonspecific skin eruption           ED Disposition       ED Disposition   Discharge    Condition   Stable    Date/Time   Fri Nov 10, 2023 [address] [facility] [address] discharge to home/self care. Follow-up Information       Follow up With Specialties Details Why Contact Info    Liang Jurado MD [address]  [phone]              Discharge Medication List as of 11/10/2023  4:55 PM        START taking these medications    Details   hydrocortisone 2.5 % cream Apply topically 2 (two) times a day, Starting Fri 11/10/2023, Normal             No discharge procedures on file.     PDMP Review       None            ED Provider  Electronically Signed by             Zachariah Mccann MD  11/14/23 7874

## 2023-12-06 ENCOUNTER — CLINICAL SUPPORT (OUTPATIENT)
Dept: FAMILY MEDICINE CLINIC | Facility: CLINIC | Age: 28
End: 2023-12-06

## 2023-12-06 DIAGNOSIS — Z11.1 ENCOUNTER FOR PPD TEST: Primary | ICD-10-CM

## 2023-12-08 ENCOUNTER — CLINICAL SUPPORT (OUTPATIENT)
Dept: FAMILY MEDICINE CLINIC | Facility: CLINIC | Age: 28
End: 2023-12-08
Payer: COMMERCIAL

## 2023-12-08 DIAGNOSIS — Z11.1 ENCOUNTER FOR PPD SKIN TEST READING: Primary | ICD-10-CM

## 2023-12-08 LAB
INDURATION: 0 MM
TB SKIN TEST: NEGATIVE

## 2023-12-08 PROCEDURE — T1015 CLINIC SERVICE: HCPCS | Performed by: FAMILY MEDICINE

## 2023-12-11 ENCOUNTER — CLINICAL SUPPORT (OUTPATIENT)
Dept: FAMILY MEDICINE CLINIC | Facility: CLINIC | Age: 28
End: 2023-12-11
Payer: COMMERCIAL

## 2023-12-11 DIAGNOSIS — Z23 ENCOUNTER FOR IMMUNIZATION: Primary | ICD-10-CM

## 2023-12-11 PROCEDURE — T1015 CLINIC SERVICE: HCPCS | Performed by: FAMILY MEDICINE

## 2023-12-11 PROCEDURE — 90686 IIV4 VACC NO PRSV 0.5 ML IM: CPT

## 2023-12-11 PROCEDURE — 90686 IIV4 VACC NO PRSV 0.5 ML IM: CPT | Performed by: FAMILY MEDICINE

## 2023-12-27 ENCOUNTER — OFFICE VISIT (OUTPATIENT)
Dept: FAMILY MEDICINE CLINIC | Facility: CLINIC | Age: 28
End: 2023-12-27
Payer: COMMERCIAL

## 2023-12-27 VITALS
OXYGEN SATURATION: 98 % | TEMPERATURE: 97.6 F | RESPIRATION RATE: 18 BRPM | BODY MASS INDEX: 26.52 KG/M2 | DIASTOLIC BLOOD PRESSURE: 82 MMHG | HEART RATE: 58 BPM | WEIGHT: 175 LBS | SYSTOLIC BLOOD PRESSURE: 122 MMHG | HEIGHT: 68 IN

## 2023-12-27 DIAGNOSIS — F41.9 ANXIETY: ICD-10-CM

## 2023-12-27 DIAGNOSIS — Z01.84 IMMUNITY STATUS TESTING: ICD-10-CM

## 2023-12-27 DIAGNOSIS — Z02.0 SCHOOL PHYSICAL EXAM: Primary | ICD-10-CM

## 2023-12-27 DIAGNOSIS — Z23 ENCOUNTER FOR IMMUNIZATION: ICD-10-CM

## 2023-12-27 DIAGNOSIS — Z23 NEED FOR HEPATITIS B BOOSTER VACCINATION: ICD-10-CM

## 2023-12-27 PROCEDURE — 90651 9VHPV VACCINE 2/3 DOSE IM: CPT | Performed by: FAMILY MEDICINE

## 2023-12-27 PROCEDURE — T1015 CLINIC SERVICE: HCPCS | Performed by: FAMILY MEDICINE

## 2023-12-27 PROCEDURE — 99213 OFFICE O/P EST LOW 20 MIN: CPT

## 2023-12-27 PROCEDURE — 90651 9VHPV VACCINE 2/3 DOSE IM: CPT

## 2023-12-27 RX ORDER — FLUOXETINE HYDROCHLORIDE 20 MG/1
20 CAPSULE ORAL DAILY
Qty: 30 CAPSULE | Refills: 0 | Status: SHIPPED | OUTPATIENT
Start: 2023-12-27 | End: 2024-01-26

## 2023-12-27 NOTE — PROGRESS NOTES
"Assessment/Plan:  Patient appears to have longstanding mild-moderate anxiety with varying but mostly mild functional impairment. Offered and advocated extensively for therapy as first-line treatment, patient has misgivings about initiating therapy and her ability to complete therapy given time constraints in life. As a result medication may be appropriate to start at this time, review dose monthly and titrate to effect.     Diagnoses and all orders for this visit:    School physical exam    Anxiety  -     FLUoxetine (PROzac) 20 mg capsule; Take 1 capsule (20 mg total) by mouth daily    Need for hepatitis B booster vaccination  -     Hepatitis B surface antibody; Future    Immunity status testing  -     Hepatitis B surface antibody; Future    Encounter for immunization  -     HPV VACCINE 9 VALENT IM          Subjective:      Patient ID: Татьяна Kaur is a 28 y.o. female.    HPI  CC: employment physical  Past few months had 2-dose series for hep B immunity, she requests new titers to see if immunity adequate for work, will order antibody. Otherwise no complaints.  accepts HPV vaccine 2nd dose  Notes anxiety x6mo since having daughter in 2015, felt she couldn't breathe. States she cannot bring herself to go to store or crowded areas, feels shaky talking to me currently. No significant change over time, waxes and wanes, fluoxetine was tried briefly but she stopped it due to trying to become pregnant in 2021, has not been on since. Not on any other prior antidepressants/antianxiety medications. Feels it occurs randomly, random unease and mind racing even when not doing anything. Gets frustrated easily, feels easy to stress out, difficulty focusing and \"feels like I'm going nuts sometimes,\" occasional heart racing, no panic attacks. Worst severity makes her not want to leave bed, otherwise mild to minimal work-life impairment. Lot of noise worsens things. Sleeps 8+ hours a day restful. Feels no SI/HI and greatly enjoys " "life, does not feel depressed at all.  Discussed therapy benefits and need for patient in managing anxiety as adjunct to any medication, patient feels that she does not wish to speak with someone about personal matters and between work, entering [employer] and raising kids she does not have much time at all for therapy either.   Has  scar endometriosis, planning to get surgery in January.    Hx: infertility 2/2 prior gonorrhea/chlamydia infection, anxiety, acne vulgaris, <0.25ppd tobacco use, not daily. Not interested in quitting at this time, smokes socially with boyfriend. No neurologic or cardiac issues, no significant FMHx.      The following portions of the patient's history were reviewed and updated as appropriate: allergies, current medications, past family history, past medical history, past social history, past surgical history, and problem list.    Review of Systems   Constitutional:  Negative for chills and fever.   HENT:  Negative for ear pain and sore throat.    Eyes:  Negative for pain and visual disturbance.   Respiratory:  Negative for cough and shortness of breath.    Cardiovascular:  Negative for chest pain and palpitations.   Gastrointestinal:  Negative for abdominal pain, constipation, diarrhea, nausea and vomiting.   Genitourinary:  Negative for dysuria and hematuria.   Musculoskeletal:  Negative for arthralgias and back pain.   Skin:  Negative for color change and rash.   Neurological:  Negative for weakness and numbness.   Psychiatric/Behavioral:  Positive for decreased concentration. Negative for agitation, behavioral problems, confusion, dysphoric mood, self-injury, sleep disturbance and suicidal ideas. The patient is nervous/anxious.    All other systems reviewed and are negative.        Objective:      /82   Pulse 58   Temp 97.6 °F (36.4 °C)   Resp 18   Ht 5' 8\" (1.727 m)   Wt 79.4 kg (175 lb)   SpO2 98%   BMI 26.61 kg/m²          Physical Exam  Vitals " reviewed.   Constitutional:       General: She is not in acute distress.     Appearance: Normal appearance. She is not ill-appearing.   HENT:      Head: Normocephalic and atraumatic.      Right Ear: Tympanic membrane, ear canal and external ear normal. There is no impacted cerumen.      Left Ear: Tympanic membrane, ear canal and external ear normal. There is no impacted cerumen.      Nose: No rhinorrhea.      Mouth/Throat:      Mouth: Mucous membranes are moist.      Pharynx: Oropharynx is clear. No oropharyngeal exudate or posterior oropharyngeal erythema.   Eyes:      General: No scleral icterus.        Right eye: No discharge.         Left eye: No discharge.      Conjunctiva/sclera: Conjunctivae normal.   Cardiovascular:      Rate and Rhythm: Normal rate and regular rhythm.      Pulses: Normal pulses.      Heart sounds: Normal heart sounds. No murmur heard.     No friction rub. No gallop.   Pulmonary:      Effort: Pulmonary effort is normal. No respiratory distress.      Breath sounds: Normal breath sounds. No stridor. No wheezing, rhonchi or rales.   Abdominal:      General: Bowel sounds are normal. There is no distension.      Palpations: Abdomen is soft. There is no mass.      Tenderness: There is no abdominal tenderness. There is no guarding or rebound.   Musculoskeletal:      Cervical back: Neck supple. No tenderness.   Lymphadenopathy:      Cervical: No cervical adenopathy.   Skin:     General: Skin is warm and dry.      Coloration: Skin is not jaundiced or pale.   Neurological:      Mental Status: She is alert.      Comments: No facial droop, slurred speech or gross focal deficits noted   Psychiatric:         Mood and Affect: Mood normal.         Behavior: Behavior normal.      Comments: No SI/HI

## 2024-01-01 PROCEDURE — 99282 EMERGENCY DEPT VISIT SF MDM: CPT

## 2024-01-02 ENCOUNTER — HOSPITAL ENCOUNTER (EMERGENCY)
Facility: HOSPITAL | Age: 29
Discharge: HOME | End: 2024-01-02
Attending: EMERGENCY MEDICINE
Payer: COMMERCIAL

## 2024-01-02 VITALS
WEIGHT: 175 LBS | BODY MASS INDEX: 26.61 KG/M2 | HEART RATE: 55 BPM | SYSTOLIC BLOOD PRESSURE: 112 MMHG | RESPIRATION RATE: 18 BRPM | DIASTOLIC BLOOD PRESSURE: 69 MMHG | TEMPERATURE: 98.4 F | OXYGEN SATURATION: 98 %

## 2024-01-02 DIAGNOSIS — K08.89 PAIN, DENTAL: Primary | ICD-10-CM

## 2024-01-02 PROCEDURE — 99284 EMERGENCY DEPT VISIT MOD MDM: CPT | Performed by: EMERGENCY MEDICINE

## 2024-01-02 RX ORDER — NAPROXEN 250 MG/1
250 TABLET ORAL ONCE
Status: COMPLETED | OUTPATIENT
Start: 2024-01-02 | End: 2024-01-02

## 2024-01-02 RX ORDER — PENICILLIN V POTASSIUM 250 MG/1
500 TABLET ORAL ONCE
Status: COMPLETED | OUTPATIENT
Start: 2024-01-02 | End: 2024-01-02

## 2024-01-02 RX ORDER — CHLORHEXIDINE GLUCONATE ORAL RINSE 1.2 MG/ML
15 SOLUTION DENTAL 2 TIMES DAILY
Qty: 120 ML | Refills: 0 | Status: SHIPPED | OUTPATIENT
Start: 2024-01-02

## 2024-01-02 RX ORDER — NAPROXEN 500 MG/1
500 TABLET ORAL 2 TIMES DAILY WITH MEALS
Qty: 10 TABLET | Refills: 0 | Status: SHIPPED | OUTPATIENT
Start: 2024-01-02 | End: 2024-01-07

## 2024-01-02 RX ORDER — PENICILLIN V POTASSIUM 500 MG/1
500 TABLET ORAL EVERY 6 HOURS SCHEDULED
Qty: 28 TABLET | Refills: 0 | Status: SHIPPED | OUTPATIENT
Start: 2024-01-02 | End: 2024-01-09

## 2024-01-02 RX ADMIN — NAPROXEN 250 MG: 250 TABLET ORAL at 00:30

## 2024-01-02 RX ADMIN — PENICILLIN V POTASSIUM 500 MG: 250 TABLET, FILM COATED ORAL at 00:30

## 2024-01-02 NOTE — ED PROVIDER NOTES
Final Diagnosis:  1. Pain, dental        Chief Complaint   Patient presents with    Dental Pain     Pt states she has dental pain left lower jaw pain last molar      HPI  Patient presents for evaluation of left mandibular pain.  Patient states that she has been having pain in this area for the past couple weeks.  Has set up a dental appointment to get x-rays done in the next week or so and then be evaluated for further intervention.  She states that her pain today is not too bad but that she complains of a foul-smelling material in that area.  Denies any difficulty with swallowing.  No fever or chills.      Unless otherwise specified:  - No language barrier.   - History obtained from patient.   - There are no limitations to the history obtained.  - Previous charting was reviewed    PMH:   has a past medical history of Gestational diabetes.    PSH:   has a past surgical history that includes  section.       ROS:  Review of Systems   - 13 point ROS was performed and all are normal unless stated in the history above.   - Nursing note reviewed. Vitals reviewed.   - Orders placed by myself and/or advanced practitioner / resident.        PE:   Vitals:    24 0002   BP: 112/69   BP Location: Right arm   Pulse: 55   Resp: 18   Temp: 98.4 °F (36.9 °C)   TempSrc: Tympanic   SpO2: 98%   Weight: 79.4 kg (175 lb)     Vitals reviewed by me.     Posterior most molar in the left mandible appears to have some damage to it, possible caries.  Oropharynx is moist.  No erythema.  No swelling.  On palpation over the angle of the jaw on that area there seems to be slightly increased swelling when compared to the right.  Unless otherwise specified above:    General: VS reviewed  Appears in NAD    Head: Normocephalic, atraumatic  Eyes: EOM-I.     ENT: Atraumatic external nose and ears.    No drooling.     Neck: No JVD.    CV: No pallor noted  Lungs:   No tachypnea  No respiratory distress    Abdomen:  Soft, non-tender,  non-distended    MSK:   No obvious deformity    Skin: Dry, intact. No obvious rash.    Psychiatric/Behavioral: Appropriate mood and affect   Exam: deferred    Physical Exam     Procedures   A:  - Nursing note reviewed.                      No orders to display     No orders of the defined types were placed in this encounter.    Labs Reviewed - No data to display      Final Diagnosis:  1. Pain, dental        P:  -Patient presents for evaluation of dental pain.  Possible periapical abscess.  Will cover with antibiotics and clarified with chlorhexidine.  No evidence of further infection of the floor of the mouth or evidence of large drainable abscess.  Continue follow-up with dentistry.  Return precautions reviewed.      Unless otherwise noted the patient's medications were reviewed and they should continue as directed.    Medications   penicillin V potassium (VEETID) tablet 500 mg (has no administration in time range)   naproxen (NAPROSYN) tablet 250 mg (has no administration in time range)     Time reflects when diagnosis was documented in both MDM as applicable and the Disposition within this note       Time User Action Codes Description Comment    1/2/2024 12:21 AM Pasquale Smith Add [K08.89] Pain, dental           ED Disposition       ED Disposition   Discharge    Condition   Stable    Date/Time   Tue Jan 2, 2024 12:21 AM    Comment   Татьяна Kaur discharge to home/self care.                   Follow-up Information       Follow up With Specialties Details Why Contact Info    [facility]    [address] [place] [id number] [phone]          Patient's Medications   Discharge Prescriptions    CHLORHEXIDINE (PERIDEX) 0.12 % SOLUTION    Apply 15 mL to the mouth or throat 2 (two) times a day       Start Date: 1/2/2024  End Date: --       Order Dose: 15 mL       Quantity: 120 mL    Refills: 0    NAPROXEN (NAPROSYN) 500 MG TABLET    Take 1 tablet (500 mg total) by mouth 2  "(two) times a day with meals for 5 days       Start Date: 1/2/2024  End Date: 1/7/2024       Order Dose: 500 mg       Quantity: 10 tablet    Refills: 0    PENICILLIN V POTASSIUM (VEETID) 500 MG TABLET    Take 1 tablet (500 mg total) by mouth every 6 (six) hours for 7 days       Start Date: 1/2/2024  End Date: 1/9/2024       Order Dose: 500 mg       Quantity: 28 tablet    Refills: 0     No discharge procedures on file.  Prior to Admission Medications   Prescriptions Last Dose Informant Patient Reported? Taking?   FLUoxetine (PROzac) 20 mg capsule   No No   Sig: Take 1 capsule (20 mg total) by mouth daily   hydrocortisone 2.5 % cream   No No   Sig: Apply topically 2 (two) times a day   Patient not taking: Reported on 12/27/2023      Facility-Administered Medications: None       Portions of the record may have been created with voice recognition software. Occasional wrong word or \"sound a like\" substitutions may have occurred due to the inherent limitations of voice recognition software. Read the chart carefully and recognize, using context, where substitutions have occurred.    Electronically signed by:  MD Pasquale Bullard MD  01/02/24 0024    "

## 2024-01-08 ENCOUNTER — TELEPHONE (OUTPATIENT)
Dept: FAMILY MEDICINE CLINIC | Facility: CLINIC | Age: 29
End: 2024-01-08

## 2024-01-08 DIAGNOSIS — Z02.0 SCHOOL PHYSICAL EXAM: ICD-10-CM

## 2024-01-08 DIAGNOSIS — Z23 NEED FOR HEPATITIS B BOOSTER VACCINATION: Primary | ICD-10-CM

## 2024-01-08 NOTE — TELEPHONE ENCOUNTER
Patient called asking if her physical forms were completed. Can someone check to see and I will call her back?

## 2024-01-09 ENCOUNTER — APPOINTMENT (OUTPATIENT)
Dept: LAB | Facility: MEDICAL CENTER | Age: 29
End: 2024-01-09
Payer: COMMERCIAL

## 2024-01-09 DIAGNOSIS — Z02.0 SCHOOL PHYSICAL EXAM: ICD-10-CM

## 2024-01-09 DIAGNOSIS — Z23 NEED FOR HEPATITIS B BOOSTER VACCINATION: ICD-10-CM

## 2024-01-09 PROCEDURE — 36415 COLL VENOUS BLD VENIPUNCTURE: CPT

## 2024-01-09 PROCEDURE — 86706 HEP B SURFACE ANTIBODY: CPT

## 2024-01-10 ENCOUNTER — TELEPHONE (OUTPATIENT)
Dept: FAMILY MEDICINE CLINIC | Facility: CLINIC | Age: 29
End: 2024-01-10

## 2024-01-10 LAB — HBV SURFACE AB SER-ACNC: >500 MIU/ML

## 2024-01-10 NOTE — TELEPHONE ENCOUNTER
Informed patient she can  her physical exam form for work/school because her hepatitis B titers are adequate and show immunity now.  -Abran Santillan MD

## 2024-01-18 ENCOUNTER — CLINICAL SUPPORT (OUTPATIENT)
Dept: FAMILY MEDICINE CLINIC | Facility: CLINIC | Age: 29
End: 2024-01-18
Payer: COMMERCIAL

## 2024-01-18 DIAGNOSIS — Z23 ENCOUNTER FOR IMMUNIZATION: Primary | ICD-10-CM

## 2024-01-18 PROCEDURE — 90746 HEPB VACCINE 3 DOSE ADULT IM: CPT

## 2024-01-18 PROCEDURE — T1015 CLINIC SERVICE: HCPCS | Performed by: FAMILY MEDICINE

## 2024-01-21 PROBLEM — Z86.19 HISTORY OF GONORRHEA: Status: ACTIVE | Noted: 2023-02-07

## 2024-01-21 PROBLEM — Z86.19 HISTORY OF CHLAMYDIA INFECTION: Status: ACTIVE | Noted: 2023-02-07

## 2024-01-21 PROBLEM — N97.9 SECONDARY FEMALE INFERTILITY: Status: ACTIVE | Noted: 2023-02-07

## 2024-02-15 DIAGNOSIS — F41.9 ANXIETY: ICD-10-CM

## 2024-02-15 RX ORDER — FLUOXETINE HYDROCHLORIDE 20 MG/1
20 CAPSULE ORAL DAILY
Qty: 30 CAPSULE | Refills: 0 | Status: SHIPPED | OUTPATIENT
Start: 2024-02-15 | End: 2024-02-20 | Stop reason: SDUPTHER

## 2024-02-20 DIAGNOSIS — F41.9 ANXIETY: ICD-10-CM

## 2024-02-20 RX ORDER — FLUOXETINE HYDROCHLORIDE 20 MG/1
20 CAPSULE ORAL DAILY
Qty: 30 CAPSULE | Refills: 0 | Status: SHIPPED | OUTPATIENT
Start: 2024-02-20 | End: 2024-03-21

## 2024-10-16 ENCOUNTER — OFFICE VISIT (OUTPATIENT)
Dept: FAMILY MEDICINE CLINIC | Facility: CLINIC | Age: 29
End: 2024-10-16
Payer: COMMERCIAL

## 2024-10-16 VITALS
HEART RATE: 77 BPM | WEIGHT: 188 LBS | HEIGHT: 68 IN | OXYGEN SATURATION: 98 % | DIASTOLIC BLOOD PRESSURE: 78 MMHG | TEMPERATURE: 99.1 F | BODY MASS INDEX: 28.49 KG/M2 | RESPIRATION RATE: 18 BRPM | SYSTOLIC BLOOD PRESSURE: 116 MMHG

## 2024-10-16 DIAGNOSIS — Z23 ENCOUNTER FOR IMMUNIZATION: ICD-10-CM

## 2024-10-16 DIAGNOSIS — Z13.9 SCREENING DUE: ICD-10-CM

## 2024-10-16 DIAGNOSIS — Z00.00 ANNUAL PHYSICAL EXAM: Primary | ICD-10-CM

## 2024-10-16 DIAGNOSIS — E66.3 OVERWEIGHT (BMI 25.0-29.9): ICD-10-CM

## 2024-10-16 DIAGNOSIS — F41.9 ANXIETY: ICD-10-CM

## 2024-10-16 DIAGNOSIS — L29.9 PRURITUS: ICD-10-CM

## 2024-10-16 DIAGNOSIS — Z11.3 SCREENING EXAMINATION FOR STI: ICD-10-CM

## 2024-10-16 PROCEDURE — T1015 CLINIC SERVICE: HCPCS | Performed by: FAMILY MEDICINE

## 2024-10-16 PROCEDURE — 99395 PREV VISIT EST AGE 18-39: CPT

## 2024-10-16 NOTE — PROGRESS NOTES
Adult Annual Physical  Name: Татьяна Kaur      : 1995      MRN: [id number]  Encounter Provider: Abran Santillan MD  Encounter Date: 10/16/2024   Encounter department: [facility]    Discussed poorly differentiated diagnosis of the skin bumps the patient mentioned, could be folliculitis, could be less likely inflammatory but will order inflammatory blood work. Had shared decision making with patient about mixed possibility of benefit in narrowing differential, and she wished to pursue bloodwork.. Recommend warm compresses in the meantime. Will order vitamin D levels as the patient is on vitamin D supplementation.  Discussed sleep hygiene, smoking cessation, patient is willing to have therapy referral and restart fluoxetine for which we will follow-up in 4-6 weeks.  Accepts flu shot and hep A at this time. Will do STI screening per patient preference.  Assessment & Plan  Annual physical exam         Encounter for immunization    Orders:    influenza vaccine preservative-free 0.5 mL IM (Fluzone, Afluria, Fluarix, Flulaval)    Hepatitis A vaccine adult IM    Anxiety    Orders:    FLUoxetine (PROzac) 20 mg capsule; Take 1 capsule (20 mg total) by mouth daily    Ambulatory referral to Psych Services; Future    Screening due    Orders:    CBC and differential; Future    TSH, 3rd generation; Future    Comprehensive metabolic panel; Future    Vitamin D 25 hydroxy; Future    Pruritus    Orders:    CBC and differential; Future    Comprehensive metabolic panel; Future    Sedimentation rate, automated; Future    C-reactive protein; Future    Overweight (BMI 25.0-29.9)    Orders:    TSH, 3rd generation; Future    Screening examination for STI    Orders:    Chlamydia/GC amplified DNA by PCR; Future    HIV 1/2 AG/AB w Reflex [facility] for 2 yr old and above; Future    Hepatitis C antibody; Future    RPR-Syphilis Screening (Total Syphilis IGG/IGM); Future    Immunizations and preventive care  "screenings were discussed with patient today. Appropriate education was printed on patient's after visit summary.    Counseling:  Sexual health: discussed sexually transmitted diseases, partner selection, use of condoms, avoidance of unintended pregnancy, and contraceptive alternatives.  Exercise: the importance of regular exercise/physical activity was discussed. Recommend exercise 3-5 times per week for at least 30 minutes.          History of Present Illness   CC: annual physical  Flu shot today, wants to schedule for TB skin test  Last visit was diagnosed with anxiety, was restarted on fluoxetine 20mg but ran out and was not refilled. States she started taking it and felt calm some days and less calm on others, was unsure if it was helping or worsening anxiety. After stopping it, anxiety felt routinely worse.   No changes to her scheduling inability to start therapy, still \"never free\" until December, is amenable to therapy.  Uses tobacco smokes socially with boyfriend, 5-7 cigarettes/day since 15yo but has had periods of abstinence, 3-5 pack years likely. Discussed smoking cessation with patient  Last winter patient started to experience pruritus all over the body during the winter months, states that lotion does not help and orders body oil, when she goes to scratch itchy areas all over her body she finds red non-scaly non-peeling non-dry or cracked \"bumps\", uncertain that she has any today but she gets these sensations mostly at night and it is happening this winter as well. Initially started on calved, then after scratching they appeared on forearms. No changes in soap, bedding clothing detergent or furniture.  No new pets. Tanning improved it. She is not tanning anymore and now notes the bumps/itching again. Takes a vitamin D supplement, no known diagnosis of vit D deficiency    Adult Annual Physical:  Patient presents for annual physical.     Diet and Physical Activity:  - Diet/Nutrition:. Salads, chicken, " steak, turkey meat, pizza, grains, fruits & vegetables  - Exercise: no formal exercise. Walks at work as CNA, discussed exercise counseling    Depression Screening:  - PHQ-2 Score: 1    General Health:  - Sleep: sleeps poorly and 4-6 hours of sleep on average. 5h/night, falls asleep midnight and wake up 5:30, gets into bed 8-9PM, feels tired around midnight and after breakfast. Eats dinner 5-7PM, occasionally snacks 8PM, exposed to screens all night, scrolls on phone once in bed with no enabled blue light filters, does not get up to walk around if having trouble falling asleep. Drinks caffeine around/after 3PM. Discussed lifestyle modifications.  - Hearing: normal hearing right ear and normal hearing left ear.  - Vision: no vision problems.  - Dental: regular dental visits and brushes teeth once daily. Recommended twice daily brushing with fluoridated toothpaste, flosses occasionally    /GYN Health:  - Follows with GYN: yes.   - Menopause: premenopausal.   - Last menstrual cycle: 9/20/2024.   - History of STDs: yes  - Contraception:. Monthly periods, last 5-7d, heavy bleeding then levels out. Had chlamydia in past. Not using protection or contraception.  Patient states she had a brief break-up with boyfriend who was then sexually active with other people and got back together and she wants sexually transmitted infection screening.      Review of Systems   Constitutional:  Negative for appetite change, chills, diaphoresis, fatigue, fever and unexpected weight change.   HENT:  Negative for congestion.    Eyes:  Negative for pain, redness and visual disturbance.   Respiratory:  Negative for cough, shortness of breath and wheezing.    Cardiovascular:  Negative for chest pain, palpitations and leg swelling.   Gastrointestinal:  Negative for abdominal pain, constipation, diarrhea, nausea and vomiting.   Endocrine: Negative for cold intolerance and heat intolerance.   Genitourinary:  Negative for difficulty urinating,  "dysuria, hematuria, vaginal bleeding and vaginal discharge.   Musculoskeletal:  Negative for arthralgias and myalgias.   Neurological:  Negative for dizziness, weakness, light-headedness and numbness.   Psychiatric/Behavioral:  Negative for agitation and confusion.          Objective   /78   Pulse 77   Temp 99.1 °F (37.3 °C)   Resp 18   Ht 5' 8\" (1.727 m)   Wt 85.3 kg (188 lb)   SpO2 98%   BMI 28.59 kg/m²     Physical Exam  Vitals and nursing note reviewed.   Constitutional:       General: She is not in acute distress.     Appearance: She is well-developed.   HENT:      Head: Normocephalic and atraumatic.      Nose: No rhinorrhea.      Mouth/Throat:      Mouth: Mucous membranes are moist.      Pharynx: Oropharynx is clear. No oropharyngeal exudate or posterior oropharyngeal erythema.   Eyes:      General: No scleral icterus.        Right eye: No discharge.         Left eye: No discharge.      Conjunctiva/sclera: Conjunctivae normal.   Cardiovascular:      Rate and Rhythm: Normal rate and regular rhythm.      Pulses: Normal pulses.      Heart sounds: Normal heart sounds. No murmur heard.     No friction rub. No gallop.   Pulmonary:      Effort: Pulmonary effort is normal. No respiratory distress.      Breath sounds: Normal breath sounds. No stridor. No wheezing, rhonchi or rales.   Abdominal:      General: Bowel sounds are normal. There is no distension.      Palpations: Abdomen is soft. There is no mass.      Tenderness: There is no abdominal tenderness. There is no guarding or rebound.   Musculoskeletal:         General: No swelling.      Cervical back: Neck supple.   Skin:     General: Skin is warm and dry.      Coloration: Skin is not jaundiced or pale.      Comments: 2-3 1 mm punctate papules nonerythematous nontender at the mid-left lower abdomen near a slight skin fold without drainage or induration.   Neurological:      Mental Status: She is alert.      Comments: No facial droop, slurred speech " or gross focal deficits noted   Psychiatric:         Mood and Affect: Mood normal.         Behavior: Behavior normal.      Comments: No Si/Hi

## 2024-10-16 NOTE — ASSESSMENT & PLAN NOTE
Orders:    FLUoxetine (PROzac) 20 mg capsule; Take 1 capsule (20 mg total) by mouth daily    Ambulatory referral to Psych Services; Future

## 2024-10-17 ENCOUNTER — TELEPHONE (OUTPATIENT)
Age: 29
End: 2024-10-17

## 2024-10-17 NOTE — TELEPHONE ENCOUNTER
Was calling pt in regards to routine referral and adding to proper wait list. LVM for pt to contact intake dept.       First Attempt

## 2024-10-21 ENCOUNTER — TELEPHONE (OUTPATIENT)
Dept: FAMILY MEDICINE CLINIC | Facility: CLINIC | Age: 29
End: 2024-10-21

## 2024-10-21 DIAGNOSIS — Z00.6 ENCOUNTER FOR EXAMINATION FOR NORMAL COMPARISON OR CONTROL IN CLINICAL RESEARCH PROGRAM: ICD-10-CM

## 2024-10-21 NOTE — TELEPHONE ENCOUNTER
Was calling pt in regards to routine referral and adding to proper wait list. LVM for pt to contact intake dept.       Second Attempt

## 2024-11-18 ENCOUNTER — TELEPHONE (OUTPATIENT)
Dept: FAMILY MEDICINE CLINIC | Facility: CLINIC | Age: 29
End: 2024-11-18

## 2024-11-18 ENCOUNTER — CLINICAL SUPPORT (OUTPATIENT)
Dept: FAMILY MEDICINE CLINIC | Facility: CLINIC | Age: 29
End: 2024-11-18

## 2024-11-18 DIAGNOSIS — Z11.1 TUBERCULIN SKIN TEST ENCOUNTER: Primary | ICD-10-CM

## 2024-11-18 NOTE — TELEPHONE ENCOUNTER
Patient scheduled for - 11/18/24  Nurse visit type - TB TEST  Patient requesting - PPD  Order: needed

## 2024-11-21 ENCOUNTER — CLINICAL SUPPORT (OUTPATIENT)
Dept: FAMILY MEDICINE CLINIC | Facility: CLINIC | Age: 29
End: 2024-11-21

## 2024-11-21 DIAGNOSIS — Z11.1 ENCOUNTER FOR PPD SKIN TEST READING: Primary | ICD-10-CM

## 2024-11-21 LAB
INDURATION: 0 MM
TB SKIN TEST: NEGATIVE

## 2024-12-09 DIAGNOSIS — F41.9 ANXIETY: ICD-10-CM

## 2024-12-30 ENCOUNTER — HOSPITAL ENCOUNTER (EMERGENCY)
Facility: HOSPITAL | Age: 29
Discharge: HOME | End: 2024-12-30
Attending: EMERGENCY MEDICINE | Admitting: EMERGENCY MEDICINE
Payer: COMMERCIAL

## 2024-12-30 VITALS
SYSTOLIC BLOOD PRESSURE: 126 MMHG | TEMPERATURE: 97.6 F | RESPIRATION RATE: 18 BRPM | DIASTOLIC BLOOD PRESSURE: 82 MMHG | OXYGEN SATURATION: 97 % | HEART RATE: 71 BPM

## 2024-12-30 DIAGNOSIS — J06.9 UPPER RESPIRATORY INFECTION: Primary | ICD-10-CM

## 2024-12-30 PROCEDURE — 99284 EMERGENCY DEPT VISIT MOD MDM: CPT | Performed by: EMERGENCY MEDICINE

## 2024-12-30 PROCEDURE — 99284 EMERGENCY DEPT VISIT MOD MDM: CPT

## 2024-12-30 RX ORDER — AZITHROMYCIN 250 MG/1
500 TABLET, FILM COATED ORAL ONCE
Status: COMPLETED | OUTPATIENT
Start: 2024-12-30 | End: 2024-12-30

## 2024-12-30 RX ORDER — PREDNISONE 20 MG/1
40 TABLET ORAL DAILY
Qty: 8 TABLET | Refills: 0 | Status: SHIPPED | OUTPATIENT
Start: 2024-12-30 | End: 2025-01-03

## 2024-12-30 RX ORDER — PREDNISONE 20 MG/1
40 TABLET ORAL ONCE
Status: COMPLETED | OUTPATIENT
Start: 2024-12-30 | End: 2024-12-30

## 2024-12-30 RX ORDER — AZITHROMYCIN 250 MG/1
250 TABLET, FILM COATED ORAL EVERY 24 HOURS
Qty: 4 TABLET | Refills: 0 | Status: SHIPPED | OUTPATIENT
Start: 2024-12-30 | End: 2025-01-03

## 2024-12-30 RX ADMIN — PREDNISONE 40 MG: 20 TABLET ORAL at 17:30

## 2024-12-30 RX ADMIN — AZITHROMYCIN DIHYDRATE 500 MG: 250 TABLET ORAL at 17:30

## 2024-12-31 NOTE — ED PROVIDER NOTES
Time reflects when diagnosis was documented in both MDM as applicable and the Disposition within this note       Time User Action Codes Description Comment    12/30/2024  5:24 PM Mame Espinal Add [J06.9] Upper respiratory infection           ED Disposition       ED Disposition   Discharge    Condition   Stable    Date/Time   Mon Dec 30, 2024  5:24 PM    Comment   Татьяна Bustilloslp discharge to home/self care.                   Assessment & Plan       Medical Decision Making  29-year-old female presents for evaluation of cough.  Patient states she has had a persistent cough for the last 2 weeks was previously dry however intermittent sputum production today.  She send no recent fevers, or admits to nasal congestion and postnasal drip.  She is overall well-appearing on examination.  Lung sounds are clear to auscultation all posterior lung fields, doubt lobar pneumonia.  Patient states she was tested for COVID at the onset of symptoms and was negative, she has had symptoms for 2 weeks now viral panel testing would not .  Will trial patient with a burst of steroids and azithromycin for upper respiratory tract infection, bronchitis, possible mycoplasma.    Risk  Prescription drug management.        ED Course as of 12/30/24 1955   Mon Dec 30, 2024   1717 Blood Pressure: 122/75   1717 Temperature: 97.5 °F (36.4 °C)   1717 Temp Source: Temporal   1717 Pulse: 78   1717 Respirations: 18   1717 SpO2: 97 %       Medications   azithromycin (ZITHROMAX) tablet 500 mg (500 mg Oral Given 12/30/24 1730)   predniSONE tablet 40 mg (40 mg Oral Given 12/30/24 1730)       ED Risk Strat Scores                          SBIRT 22yo+      Flowsheet Row Most Recent Value   Initial Alcohol Screen: US AUDIT-C     1. How often do you have a drink containing alcohol? 0 Filed at: 12/30/2024 1706   2. How many drinks containing alcohol do you have on a typical day you are drinking?  0 Filed at: 12/30/2024 1706   3b. FEMALE Any Age, or  MALE 65+: How often do you have 4 or more drinks on one occassion? 0 Filed at: 2024   Audit-C Score 0 Filed at: 2024   KRISTA: How many times in the past year have you...    Used an illegal drug or used a prescription medication for non-medical reasons? Never Filed at: 2024                            History of Present Illness       Chief Complaint   Patient presents with    Cough     Cough for 2 weeks per patient, cannot get rid of it. Has been tested for COVID 2x which have been both negative. States here to see if anything else can be done to help treat cough       Past Medical History:   Diagnosis Date    Gestational diabetes       Past Surgical History:   Procedure Laterality Date     SECTION        History reviewed. No pertinent family history.   Social History     Tobacco Use    Smoking status: Some Days    Smokeless tobacco: Never   Vaping Use    Vaping status: Never Used   Substance Use Topics    Alcohol use: Not Currently     Comment: socially    Drug use: No      E-Cigarette/Vaping    E-Cigarette Use Never User       E-Cigarette/Vaping Substances    Nicotine No     THC No     CBD No     Flavoring No     Other No     Unknown No       I have reviewed and agree with the history as documented.     29-year-old female presents for persistent cough.  Patient states cough started about 2 weeks ago, it was dry however intermittently now is sputum producing, she additionally notes postnasal drip and nasal congestion.  States she tested negative for viral infections initially.  She denies fevers, dyspnea or chest pain, GI upset.  Patient states that her son had some similar symptoms however his been remitted for some time.        Review of Systems   Constitutional:  Negative for activity change, appetite change and fever.   HENT:  Positive for congestion and postnasal drip.    Respiratory:  Positive for cough. Negative for shortness of breath.    Cardiovascular:  Negative for  chest pain.   Gastrointestinal:  Negative for abdominal pain, diarrhea, nausea and vomiting.   Musculoskeletal:  Negative for myalgias.   All other systems reviewed and are negative.          Objective       ED Triage Vitals [12/30/24 1705]   Temperature Pulse Blood Pressure Respirations SpO2 Patient Position - Orthostatic VS   97.5 °F (36.4 °C) 78 122/75 18 97 % --      Temp Source Heart Rate Source BP Location FiO2 (%) Pain Score    Temporal Monitor -- -- No Pain      Vitals      Date and Time Temp Pulse SpO2 Resp BP Pain Score FACES Pain Rating User   12/30/24 1715 97.6 °F (36.4 °C) 71 97 % 18 126/82 No Pain -- JL   12/30/24 1705 97.5 °F (36.4 °C) 78 97 % 18 122/75 No Pain -- JL            Physical Exam  Vitals reviewed.   Constitutional:       General: She is not in acute distress.     Appearance: Normal appearance. She is not ill-appearing, toxic-appearing or diaphoretic.   HENT:      Head: Normocephalic and atraumatic.      Right Ear: External ear normal.      Left Ear: External ear normal.      Nose: Nose normal.      Mouth/Throat:      Mouth: Mucous membranes are moist.      Pharynx: No oropharyngeal exudate or posterior oropharyngeal erythema.   Eyes:      General: No scleral icterus.        Right eye: No discharge.         Left eye: No discharge.   Cardiovascular:      Rate and Rhythm: Normal rate and regular rhythm.   Pulmonary:      Effort: Pulmonary effort is normal. No respiratory distress.      Breath sounds: Normal breath sounds. No stridor. No wheezing, rhonchi or rales.   Musculoskeletal:         General: No deformity or signs of injury.   Skin:     General: Skin is warm.      Coloration: Skin is not jaundiced or pale.   Neurological:      General: No focal deficit present.      Mental Status: She is alert.         Results Reviewed       None            No orders to display       Procedures    ED Medication and Procedure Management   Prior to Admission Medications   Prescriptions Last Dose  Informant Patient Reported? Taking?   FLUoxetine (PROzac) 20 mg capsule   No No   Sig: Take 1 capsule (20 mg total) by mouth daily   hydrocortisone 2.5 % cream   No No   Sig: Apply topically 2 (two) times a day   Patient not taking: Reported on 12/27/2023      Facility-Administered Medications: None     Discharge Medication List as of 12/30/2024  5:26 PM        START taking these medications    Details   azithromycin (ZITHROMAX) 250 mg tablet Take 1 tablet (250 mg total) by mouth every 24 hours for 4 days, Starting Mon 12/30/2024, Until Fri 1/3/2025, Normal      predniSONE 20 mg tablet Take 2 tablets (40 mg total) by mouth daily for 4 days, Starting Mon 12/30/2024, Until Fri 1/3/2025, Normal           CONTINUE these medications which have NOT CHANGED    Details   FLUoxetine (PROzac) 20 mg capsule Take 1 capsule (20 mg total) by mouth daily, Starting Tue 12/10/2024, Normal      hydrocortisone 2.5 % cream Apply topically 2 (two) times a day, Starting Fri 11/10/2023, Normal           No discharge procedures on file.  ED SEPSIS DOCUMENTATION   Time reflects when diagnosis was documented in both MDM as applicable and the Disposition within this note       Time User Action Codes Description Comment    12/30/2024  5:24 PM Mame Espinal Add [J06.9] Upper respiratory infection                  Mame Espinal,   12/30/24 1955

## 2025-08-20 ENCOUNTER — TELEPHONE (OUTPATIENT)
Age: 30
End: 2025-08-20

## 2025-08-20 ENCOUNTER — OFFICE VISIT (OUTPATIENT)
Age: 30
End: 2025-08-20

## 2025-08-20 VITALS
HEART RATE: 71 BPM | RESPIRATION RATE: 16 BRPM | SYSTOLIC BLOOD PRESSURE: 108 MMHG | WEIGHT: 195.6 LBS | HEIGHT: 68 IN | TEMPERATURE: 97.1 F | OXYGEN SATURATION: 99 % | DIASTOLIC BLOOD PRESSURE: 70 MMHG | BODY MASS INDEX: 29.64 KG/M2

## 2025-08-20 DIAGNOSIS — F41.9 ANXIETY: ICD-10-CM

## 2025-08-20 DIAGNOSIS — Z00.00 ENCOUNTER FOR ROUTINE HISTORY AND PHYSICAL EXAM IN FEMALE PATIENT: Primary | ICD-10-CM

## 2025-08-20 PROCEDURE — 99214 OFFICE O/P EST MOD 30 MIN: CPT | Performed by: STUDENT IN AN ORGANIZED HEALTH CARE EDUCATION/TRAINING PROGRAM

## 2025-08-20 RX ORDER — ESCITALOPRAM OXALATE 10 MG/1
10 TABLET ORAL DAILY
Qty: 30 TABLET | Refills: 1 | Status: SHIPPED | OUTPATIENT
Start: 2025-08-20